# Patient Record
Sex: FEMALE | Race: WHITE | NOT HISPANIC OR LATINO | Employment: FULL TIME | URBAN - METROPOLITAN AREA
[De-identification: names, ages, dates, MRNs, and addresses within clinical notes are randomized per-mention and may not be internally consistent; named-entity substitution may affect disease eponyms.]

---

## 2017-02-16 ENCOUNTER — GENERIC CONVERSION - ENCOUNTER (OUTPATIENT)
Dept: OTHER | Facility: OTHER | Age: 27
End: 2017-02-16

## 2017-05-02 ENCOUNTER — GENERIC CONVERSION - ENCOUNTER (OUTPATIENT)
Dept: OTHER | Facility: OTHER | Age: 27
End: 2017-05-02

## 2017-06-19 ENCOUNTER — GENERIC CONVERSION - ENCOUNTER (OUTPATIENT)
Dept: OTHER | Facility: OTHER | Age: 27
End: 2017-06-19

## 2017-10-19 ENCOUNTER — GENERIC CONVERSION - ENCOUNTER (OUTPATIENT)
Dept: OTHER | Facility: OTHER | Age: 27
End: 2017-10-19

## 2017-10-19 ENCOUNTER — GENERIC CONVERSION - ENCOUNTER (OUTPATIENT)
Dept: FAMILY MEDICINE CLINIC | Facility: CLINIC | Age: 27
End: 2017-10-19

## 2017-11-13 ENCOUNTER — GENERIC CONVERSION - ENCOUNTER (OUTPATIENT)
Dept: FAMILY MEDICINE CLINIC | Facility: CLINIC | Age: 27
End: 2017-11-13

## 2018-03-06 ENCOUNTER — OFFICE VISIT (OUTPATIENT)
Dept: FAMILY MEDICINE CLINIC | Facility: CLINIC | Age: 28
End: 2018-03-06
Payer: COMMERCIAL

## 2018-03-06 VITALS
HEART RATE: 96 BPM | HEIGHT: 61 IN | DIASTOLIC BLOOD PRESSURE: 78 MMHG | SYSTOLIC BLOOD PRESSURE: 112 MMHG | BODY MASS INDEX: 19.83 KG/M2 | TEMPERATURE: 100.4 F | WEIGHT: 105 LBS | OXYGEN SATURATION: 98 %

## 2018-03-06 DIAGNOSIS — J11.1 INFLUENZA: Primary | ICD-10-CM

## 2018-03-06 PROCEDURE — 3008F BODY MASS INDEX DOCD: CPT | Performed by: NURSE PRACTITIONER

## 2018-03-06 PROCEDURE — 99213 OFFICE O/P EST LOW 20 MIN: CPT | Performed by: NURSE PRACTITIONER

## 2018-03-06 RX ORDER — LEVOTHYROXINE SODIUM 25 UG/1
25 TABLET ORAL DAILY
COMMUNITY
Start: 2018-02-27 | End: 2018-04-23 | Stop reason: SDUPTHER

## 2018-03-06 RX ORDER — DEXTROAMPHETAMINE SACCHARATE, AMPHETAMINE ASPARTATE MONOHYDRATE, DEXTROAMPHETAMINE SULFATE AND AMPHETAMINE SULFATE 6.25; 6.25; 6.25; 6.25 MG/1; MG/1; MG/1; MG/1
1 CAPSULE, EXTENDED RELEASE ORAL DAILY
COMMUNITY

## 2018-03-06 RX ORDER — OSELTAMIVIR PHOSPHATE 75 MG/1
75 CAPSULE ORAL EVERY 12 HOURS SCHEDULED
Qty: 10 CAPSULE | Refills: 0 | Status: SHIPPED | OUTPATIENT
Start: 2018-03-06 | End: 2018-03-11

## 2018-03-06 NOTE — PROGRESS NOTES
Assessment/Plan   Diagnoses and all orders for this visit:    Influenza  -     oseltamivir (TAMIFLU) 75 mg capsule; Take 1 capsule (75 mg total) by mouth every 12 (twelve) hours for 5 days    Other orders  -     amphetamine-dextroamphetamine (ADDERALL XR, 25MG,) 25 MG 24 hr capsule; Take 1 capsule by mouth daily  -     UNITHROID 25 MCG tablet; Chief Complaint   Patient presents with    Headache     for 1 day    Sore Throat     for 1 day    Generalized Body Aches     for 1 day    Fever     for 1 day       Subjective   Patient ID: Cesar Head is a 28 y o  female  Vitals:    03/06/18 0944   BP: 112/78   Pulse: 96   Temp: 100 4 °F (38 °C)   SpO2: 98%     Sore Throat    This is a new problem  The current episode started yesterday  The problem has been unchanged  Neither side of throat is experiencing more pain than the other  The maximum temperature recorded prior to her arrival was 101 - 101 9 F  The fever has been present for 1 to 2 days  The pain is at a severity of 9/10  The pain is severe  Associated symptoms include congestion, coughing, headaches and swollen glands  Pertinent negatives include no abdominal pain, diarrhea, drooling, ear discharge, hoarse voice, plugged ear sensation, neck pain, shortness of breath, stridor, trouble swallowing or vomiting  She has had no exposure to strep or mono  She has tried nothing for the symptoms  Generalized Body Aches   The current episode started yesterday  The problem occurs constantly  The problem has been gradually worsening since onset  The pain is moderate  Nothing aggravates the symptoms  Associated symptoms include congestion, headaches, rhinorrhea, a sore throat, swollen glands, fatigue, a fever and coughing  Pertinent negatives include no double vision, ear discharge, eye itching, photophobia, stridor, weight gain, weight loss, chest pain, shortness of breath, abdominal pain, diarrhea, nausea, vomiting or neck pain   Past treatments include nothing  The treatment provided no relief  The fever has been present for 1 to 2 days  Her temperature was unmeasured prior to arrival  The cough is non-productive  Nothing worsens the cough  The congestion does not interfere with sleep  The congestion does not interfere with eating or drinking  The rhinorrhea has been occurring frequently  The nasal discharge has a clear appearance  She has been experiencing a moderate sore throat  Neither side is more painful than the other  There were sick contacts at work  The following portions of the patient's history were reviewed and updated as appropriate: allergies, current medications, past family history, past medical history, past surgical history and problem list     Review of Systems   Constitutional: Positive for fatigue and fever  Negative for weight gain and weight loss  HENT: Positive for congestion, rhinorrhea and sore throat  Negative for drooling, ear discharge, hoarse voice and trouble swallowing  Eyes: Negative  Negative for double vision, photophobia and itching  Respiratory: Positive for cough  Negative for shortness of breath and stridor  Cardiovascular: Negative  Negative for chest pain  Gastrointestinal: Negative  Negative for abdominal pain, diarrhea, nausea and vomiting  Endocrine: Negative  Genitourinary: Negative  Musculoskeletal: Negative  Negative for neck pain  Skin: Negative  Allergic/Immunologic: Negative  Neurological: Positive for headaches  Hematological: Negative  Psychiatric/Behavioral: Negative  Objective     Physical Exam   Constitutional: She is oriented to person, place, and time  She appears well-nourished  No distress (appears ill)  HENT:   Head: Normocephalic  Right Ear: External ear normal  No mastoid tenderness  Tympanic membrane is not injected, not erythematous and not bulging  A middle ear effusion is present  Left Ear: External ear normal  No mastoid tenderness   Tympanic membrane is not injected, not erythematous and not bulging  A middle ear effusion is present  Nose: Rhinorrhea present  No mucosal edema or sinus tenderness  Right sinus exhibits no maxillary sinus tenderness and no frontal sinus tenderness  Left sinus exhibits no maxillary sinus tenderness and no frontal sinus tenderness  Mouth/Throat: Uvula is midline and oropharynx is clear and moist  Mucous membranes are not pale  No oropharyngeal exudate, posterior oropharyngeal edema, posterior oropharyngeal erythema or tonsillar abscesses  Tonsils are 0 on the right  Tonsils are 0 on the left  No tonsillar exudate  Eyes: Conjunctivae are normal  Right eye exhibits no discharge  Left eye exhibits no discharge  Neck: Normal range of motion  No thyromegaly present  Cardiovascular: Normal rate, regular rhythm and normal heart sounds  Exam reveals no gallop and no friction rub  No murmur heard  Pulmonary/Chest: Effort normal and breath sounds normal  No respiratory distress  She has no wheezes  She has no rales  Abdominal: Soft  Bowel sounds are normal  She exhibits no distension  There is no tenderness  There is no guarding  Musculoskeletal: Normal range of motion  She exhibits no tenderness  Lymphadenopathy:     She has cervical adenopathy (anterior and posterior cervical nodes with tenderness)  Neurological: She is alert and oriented to person, place, and time  Skin: Skin is warm and dry  No rash noted  No erythema  No pallor  Psychiatric: She has a normal mood and affect   Her behavior is normal  Judgment and thought content normal

## 2018-03-06 NOTE — PATIENT INSTRUCTIONS

## 2018-03-06 NOTE — LETTER
March 6, 2018     Patient: Minal Marcelino   YOB: 1990   Date of Visit: 3/6/2018       To Whom it May Concern:    Minal Marcelino is under my professional care  She was seen in my office on 3/6/2018  She may return to work on 3/12/2018  If you have any questions or concerns, please don't hesitate to call           Sincerely,          BRAD Cotton        CC: No Recipients

## 2018-04-20 LAB
FT4I SERPL CALC-MCNC: 2.2 (ref 1.4–3.8)
T3RU NFR SERPL: 31 % (ref 22–35)
T4 SERPL-MCNC: 7.2 MCG/DL (ref 4.5–12)
TSH SERPL-ACNC: 2.89 MIU/L

## 2018-04-23 ENCOUNTER — OFFICE VISIT (OUTPATIENT)
Dept: ENDOCRINOLOGY | Facility: HOSPITAL | Age: 28
End: 2018-04-23
Payer: COMMERCIAL

## 2018-04-23 VITALS
BODY MASS INDEX: 20.13 KG/M2 | DIASTOLIC BLOOD PRESSURE: 64 MMHG | HEIGHT: 62 IN | HEART RATE: 86 BPM | SYSTOLIC BLOOD PRESSURE: 116 MMHG | WEIGHT: 109.4 LBS

## 2018-04-23 DIAGNOSIS — Z86.39 HISTORY OF GRAVES' DISEASE: Primary | ICD-10-CM

## 2018-04-23 DIAGNOSIS — E89.0 POSTOPERATIVE HYPOTHYROIDISM: ICD-10-CM

## 2018-04-23 PROCEDURE — 99204 OFFICE O/P NEW MOD 45 MIN: CPT | Performed by: INTERNAL MEDICINE

## 2018-04-23 RX ORDER — LEVOTHYROXINE SODIUM 25 UG/1
25 TABLET ORAL DAILY
Qty: 32 TABLET | Refills: 11
Start: 2018-04-23 | End: 2018-06-26 | Stop reason: SDUPTHER

## 2018-04-23 NOTE — PROGRESS NOTES
4/23/2018    Assessment/Plan      Diagnoses and all orders for this visit:    History of Graves' disease  -     UNITHROID 25 MCG tablet; Take 1 tablet (25 mcg total) by mouth daily 1 tab daily 6 days of the week  1 5 tabs the 7th day  -     TSH, 3rd generation Lab Collect; Future  -     T3, free; Future  -     T4, free Lab Collect; Future  -     TSH, 3rd generation Lab Collect; Future  -     T4, free; Future    Postoperative hypothyroidism  -     UNITHROID 25 MCG tablet; Take 1 tablet (25 mcg total) by mouth daily 1 tab daily 6 days of the week  1 5 tabs the 7th day  -     TSH, 3rd generation Lab Collect; Future  -     T3, free; Future  -     T4, free Lab Collect; Future  -     TSH, 3rd generation Lab Collect; Future  -     T4, free; Future        Assessment/Plan:  1  Hypothyroidism postoperative: Will increase Unithroid 25 mcg tablets to 1 tablet 6 days of the week 1 5 tablets the 7th day  Will recheck TSH and free T4 in about 2 months  Follow-up in 6 months with blood work just prior  Discussed that if she were to become pregnant, she should let us know so we can increase her thyroid hormone replacement dose appropriately  2  History of hyperthyroidism status post surgery: The fact that she is requiring such as small dose of Unithroid makes me wonder if is possible that she may develop current hyperthyroidism so we will need to follow her levels  She will schedule with an eye doctor given changes in vision recently  And her history of Graves disease  CC:    hypothyroidism    History of Present Illness     HPI: Dolly Perry is a 28y o  year old female with history of  Hyperthyroidism due to Graves disease who underwent thyroidectomy and subsequent hypothyroidism presents to [facility] care  Previous patient doctor Richi  In 2015 she developed palpitations, tachycardia, weight loss  She then underwent thyroidectomy in September of 2015 and pathology showed benign thyroid tissue    This was done at [facility]   She then went on thyroid hormone replacement and her dose was decreased  She is required to surprisingly low dose of Unithroid and currently takes 25 mcg daily  Overall, she feels okay  She does note some fatigue  Denies any persistent symptoms of hyperthyroidism  Denies any neck compression  Denies family history of thyroid disease to her knowledge  She denies any eye discomfort, but does note some vision changes  Review of Systems   Constitutional: Positive for fatigue  Negative for chills and fever  HENT: Negative for trouble swallowing and voice change  Eyes: Negative for visual disturbance  Respiratory: Negative for shortness of breath  Cardiovascular: Negative for chest pain, palpitations and leg swelling  Gastrointestinal: Negative for abdominal pain, nausea and vomiting  Endocrine: Negative for cold intolerance, heat intolerance, polydipsia and polyuria  Musculoskeletal: Negative for arthralgias and myalgias  Skin: Negative for rash  Neurological: Negative for dizziness, tremors and weakness  Hematological: Negative for adenopathy  Psychiatric/Behavioral: Negative for agitation and confusion  Historical Information   No past medical history on file  No past surgical history on file    Social History   History   Alcohol Use    Yes     Comment: social     History   Drug Use No     History   Smoking Status    Former Smoker    Packs/day: 1 00    Years: 10 00    Types: Cigarettes    Quit date: 1/6/2018   Smokeless Tobacco    Never Used     Family History:   Family History   Problem Relation Age of Onset    Diabetes type II Mother     Hyperlipidemia Father     Hypertension Father        Meds/Allergies   Current Outpatient Prescriptions   Medication Sig Dispense Refill    amphetamine-dextroamphetamine (ADDERALL XR, 25MG,) 25 MG 24 hr capsule Take 1 capsule by mouth daily      UNITHROID 25 MCG tablet Take 1 tablet (25 mcg total) by mouth daily 1 tab daily 6 days of the week  1 5 tabs the 7th day  32 tablet 11     No current facility-administered medications for this visit  Allergies   Allergen Reactions    Penicillin V Hives       Objective   Vitals: Blood pressure 116/64, pulse 86, height 5' 1 75" (1 568 m), weight 49 6 kg (109 lb 6 4 oz)  Invasive Devices          No matching active lines, drains, or airways          Physical Exam   Constitutional: She is oriented to person, place, and time  She appears well-developed and well-nourished  No distress  HENT:   Head: Normocephalic and atraumatic  Mouth/Throat: No oropharyngeal exudate  Mild exopthalmos  Eyes: Conjunctivae and EOM are normal  Pupils are equal, round, and reactive to light  No scleral icterus  Neck: Normal range of motion  Neck supple  No thyromegaly present  Cardiovascular: Normal rate and regular rhythm  No murmur heard  Pulmonary/Chest: Effort normal and breath sounds normal  She has no wheezes  Abdominal: Soft  Bowel sounds are normal  She exhibits no distension  There is no tenderness  Musculoskeletal: Normal range of motion  She exhibits no edema  Neurological: She is alert and oriented to person, place, and time  She exhibits normal muscle tone  Skin: Skin is warm and dry  No rash noted  She is not diaphoretic  Psychiatric: She has a normal mood and affect  Her behavior is normal        The history was obtained from the review of the chart and from the patient      Lab Results:       Lab Results   Component Value Date    CREATININE 0 26 (L) 05/13/2015    BUN 8 05/13/2015     05/13/2015    K 4 7 05/13/2015     05/13/2015    CO2 22 (L) 05/13/2015       Lab Results   Component Value Date    CHOL 192 05/13/2015    HDL 46 05/13/2015    TRIG 133 05/13/2015       Lab Results   Component Value Date    ALT 59 05/13/2015    AST 25 05/13/2015    ALKPHOS 74 05/13/2015    BILITOT 0 58 05/13/2015       Lab Results   Component Value Date TSH 2 89 04/19/2018    FREET4 5 6 (HH) 05/13/2015       Recent Results (from the past 41645 hour(s))   Thyroid Panel With TSH    Collection Time: 04/19/18 10:55 AM   Result Value Ref Range    T3 Uptake Ratio 31 22 - 35 %    T4, Total 7 2 4 5 - 12 0 mcg/dL    Free T4 Index (T7) 2 2 1 4 - 3 8    TSH 2 89 mIU/L       Labs from [facility] in May of 2015 showed an elevated microsomal antibody  ultrasound done at [facility] on May 14, 2015 showed no dominant nodules but did show markedly hyperemic thyroid gland containing numerous micro nodules compatible with thyroiditis  No future appointments

## 2018-04-23 NOTE — LETTER
April 23, 2018     Bony Jennings, [address]  [facility] [address]    Patient: Nicolas Browning   YOB: 1990   Date of Visit: 4/23/2018       Dear Dr Peña Late: Thank you for referring Nicolas Browning to me for evaluation  Below are my notes for this consultation  If you have questions, please do not hesitate to call me  I look forward to following your patient along with you  Sincerely,        Mini Rivera DO        CC: No Recipients  Mini Rivera DO  4/23/2018  1:13 PM  Sign at close encounter  4/23/2018    Assessment/Plan      Diagnoses and all orders for this visit:    History of Graves' disease  -     UNITHROID 25 MCG tablet; Take 1 tablet (25 mcg total) by mouth daily 1 tab daily 6 days of the week  1 5 tabs the 7th day  -     TSH, 3rd generation Lab Collect; Future  -     T3, free; Future  -     T4, free Lab Collect; Future  -     TSH, 3rd generation Lab Collect; Future  -     T4, free; Future    Postoperative hypothyroidism  -     UNITHROID 25 MCG tablet; Take 1 tablet (25 mcg total) by mouth daily 1 tab daily 6 days of the week  1 5 tabs the 7th day  -     TSH, 3rd generation Lab Collect; Future  -     T3, free; Future  -     T4, free Lab Collect; Future  -     TSH, 3rd generation Lab Collect; Future  -     T4, free; Future        Assessment/Plan:  1  Hypothyroidism postoperative: Will increase Unithroid 25 mcg tablets to 1 tablet 6 days of the week 1 5 tablets the 7th day  Will recheck TSH and free T4 in about 2 months  Follow-up in 6 months with blood work just prior  Discussed that if she were to become pregnant, she should let us know so we can increase her thyroid hormone replacement dose appropriately  2  History of hyperthyroidism status post surgery: The fact that she is requiring such as small dose of Unithroid makes me wonder if is possible that she may develop current hyperthyroidism so we will need to follow her levels    She will schedule with an eye doctor given changes in vision recently  And her history of Graves disease  CC:    hypothyroidism    History of Present Illness     HPI: Mak Hall is a 28y o  year old female with history of  Hyperthyroidism due to Graves disease who underwent thyroidectomy and subsequent hypothyroidism presents to establish care  Previous patient doctor Richi  In 2015 she developed palpitations, tachycardia, weight loss  She then underwent thyroidectomy in September of 2015 and pathology showed benign thyroid tissue  This was done at [facility]   She then went on thyroid hormone replacement and her dose was decreased  She is required to surprisingly low dose of Unithroid and currently takes 25 mcg daily  Overall, she feels okay  She does note some fatigue  Denies any persistent symptoms of hyperthyroidism  Denies any neck compression  Denies family history of thyroid disease to her knowledge  She denies any eye discomfort, but does note some vision changes  Review of Systems   Constitutional: Positive for fatigue  Negative for chills and fever  HENT: Negative for trouble swallowing and voice change  Eyes: Negative for visual disturbance  Respiratory: Negative for shortness of breath  Cardiovascular: Negative for chest pain, palpitations and leg swelling  Gastrointestinal: Negative for abdominal pain, nausea and vomiting  Endocrine: Negative for cold intolerance, heat intolerance, polydipsia and polyuria  Musculoskeletal: Negative for arthralgias and myalgias  Skin: Negative for rash  Neurological: Negative for dizziness, tremors and weakness  Hematological: Negative for adenopathy  Psychiatric/Behavioral: Negative for agitation and confusion  Historical Information   No past medical history on file  No past surgical history on file    Social History   History   Alcohol Use    Yes     Comment: social     History   Drug Use No     History   Smoking Status    Former Smoker    Packs/day: 1 00    Years: 10 00    Types: Cigarettes    Quit date: 1/6/2018   Smokeless Tobacco    Never Used     Family History:   Family History   Problem Relation Age of Onset    Diabetes type II Mother     Hyperlipidemia Father     Hypertension Father        Meds/Allergies   Current Outpatient Prescriptions   Medication Sig Dispense Refill    amphetamine-dextroamphetamine (ADDERALL XR, 25MG,) 25 MG 24 hr capsule Take 1 capsule by mouth daily      UNITHROID 25 MCG tablet Take 1 tablet (25 mcg total) by mouth daily 1 tab daily 6 days of the week  1 5 tabs the 7th day  32 tablet 11     No current facility-administered medications for this visit  Allergies   Allergen Reactions    Penicillin V Hives       Objective   Vitals: Blood pressure 116/64, pulse 86, height 5' 1 75" (1 568 m), weight 49 6 kg (109 lb 6 4 oz)  Invasive Devices          No matching active lines, drains, or airways          Physical Exam   Constitutional: She is oriented to person, place, and time  She appears well-developed and well-nourished  No distress  HENT:   Head: Normocephalic and atraumatic  Mouth/Throat: No oropharyngeal exudate  Mild exopthalmos  Eyes: Conjunctivae and EOM are normal  Pupils are equal, round, and reactive to light  No scleral icterus  Neck: Normal range of motion  Neck supple  No thyromegaly present  Cardiovascular: Normal rate and regular rhythm  No murmur heard  Pulmonary/Chest: Effort normal and breath sounds normal  She has no wheezes  Abdominal: Soft  Bowel sounds are normal  She exhibits no distension  There is no tenderness  Musculoskeletal: Normal range of motion  She exhibits no edema  Neurological: She is alert and oriented to person, place, and time  She exhibits normal muscle tone  Skin: Skin is warm and dry  No rash noted  She is not diaphoretic  Psychiatric: She has a normal mood and affect   Her behavior is normal        The history was obtained from the review of the chart and from the patient  Lab Results:       Lab Results   Component Value Date    CREATININE 0 26 (L) 05/13/2015    BUN 8 05/13/2015     05/13/2015    K 4 7 05/13/2015     05/13/2015    CO2 22 (L) 05/13/2015       Lab Results   Component Value Date    CHOL 192 05/13/2015    HDL 46 05/13/2015    TRIG 133 05/13/2015       Lab Results   Component Value Date    ALT 59 05/13/2015    AST 25 05/13/2015    ALKPHOS 74 05/13/2015    BILITOT 0 58 05/13/2015       Lab Results   Component Value Date    TSH 2 89 04/19/2018    FREET4 5 6 (HH) 05/13/2015       Recent Results (from the past 56447 hour(s))   Thyroid Panel With TSH    Collection Time: 04/19/18 10:55 AM   Result Value Ref Range    T3 Uptake Ratio 31 22 - 35 %    T4, Total 7 2 4 5 - 12 0 mcg/dL    Free T4 Index (T7) 2 2 1 4 - 3 8    TSH 2 89 mIU/L       Labs from [facility] in May of 2015 showed an elevated microsomal antibody  ultrasound done at [facility] on May 14, 2015 showed no dominant nodules but did show markedly hyperemic thyroid gland containing numerous micro nodules compatible with thyroiditis  No future appointments

## 2018-06-26 DIAGNOSIS — E89.0 POSTOPERATIVE HYPOTHYROIDISM: ICD-10-CM

## 2018-06-26 DIAGNOSIS — Z86.39 HISTORY OF GRAVES' DISEASE: ICD-10-CM

## 2018-06-26 RX ORDER — LEVOTHYROXINE SODIUM 25 UG/1
25 TABLET ORAL DAILY
Qty: 32 TABLET | Refills: 0
Start: 2018-06-26 | End: 2018-06-27 | Stop reason: SDUPTHER

## 2018-06-27 DIAGNOSIS — E89.0 POSTOPERATIVE HYPOTHYROIDISM: ICD-10-CM

## 2018-06-27 DIAGNOSIS — Z86.39 HISTORY OF GRAVES' DISEASE: ICD-10-CM

## 2018-06-27 RX ORDER — LEVOTHYROXINE SODIUM 25 UG/1
25 TABLET ORAL DAILY
Qty: 32 TABLET | Refills: 2 | Status: SHIPPED | OUTPATIENT
Start: 2018-06-27 | End: 2018-09-18 | Stop reason: SDUPTHER

## 2018-06-29 LAB
T3FREE SERPL-MCNC: 3 PG/ML (ref 2.3–4.2)
T4 FREE SERPL-MCNC: 1.1 NG/DL (ref 0.8–1.8)
TSH SERPL-ACNC: 1.89 MIU/L

## 2018-09-18 DIAGNOSIS — Z86.39 HISTORY OF GRAVES' DISEASE: ICD-10-CM

## 2018-09-18 DIAGNOSIS — E89.0 POSTOPERATIVE HYPOTHYROIDISM: ICD-10-CM

## 2018-09-18 RX ORDER — LEVOTHYROXINE SODIUM 25 UG/1
TABLET ORAL
Qty: 32 TABLET | Refills: 2 | Status: SHIPPED | OUTPATIENT
Start: 2018-09-18 | End: 2018-11-26

## 2018-10-19 ENCOUNTER — TELEPHONE (OUTPATIENT)
Dept: ENDOCRINOLOGY | Facility: HOSPITAL | Age: 28
End: 2018-10-19

## 2018-10-19 DIAGNOSIS — E04.9 THYROID ENLARGEMENT: Primary | ICD-10-CM

## 2018-10-19 DIAGNOSIS — E05.90 HYPERTHYROIDISM: ICD-10-CM

## 2018-10-19 NOTE — TELEPHONE ENCOUNTER
Pt's mom calling on behalf of patient requesting labs be faxed to [facility] for 10/23/18 appt  Could you re-date or reorder TSH and T4, free (dated 10/23/18) so that she can get done now   Thanks

## 2018-10-21 LAB
T4 FREE SERPL-MCNC: 1 NG/DL (ref 0.8–1.8)
TSH SERPL-ACNC: 3.67 MIU/L

## 2018-10-23 ENCOUNTER — OFFICE VISIT (OUTPATIENT)
Dept: ENDOCRINOLOGY | Facility: HOSPITAL | Age: 28
End: 2018-10-23
Payer: COMMERCIAL

## 2018-10-23 VITALS
DIASTOLIC BLOOD PRESSURE: 70 MMHG | HEART RATE: 80 BPM | SYSTOLIC BLOOD PRESSURE: 104 MMHG | BODY MASS INDEX: 21.42 KG/M2 | HEIGHT: 62 IN | WEIGHT: 116.4 LBS

## 2018-10-23 DIAGNOSIS — E89.0 POSTOPERATIVE HYPOTHYROIDISM: Primary | ICD-10-CM

## 2018-10-23 DIAGNOSIS — Z86.39 HISTORY OF GRAVES' DISEASE: ICD-10-CM

## 2018-10-23 PROCEDURE — 99213 OFFICE O/P EST LOW 20 MIN: CPT | Performed by: NURSE PRACTITIONER

## 2018-10-23 NOTE — PROGRESS NOTES
Radene Sandifer 33 y o  female MRN: [id number]    Encounter: [id number]      Assessment/Plan     Assessment: This is a 33y o -year-old female with postsurgical hypothyroidism and history of Graves disease  Plan:  1  Hypothyroidism postoperative:  Her most recent TSH is in the upper end of normal  I have asked her to increase her dose of unithroid to 50 mcg daily  Recheck TSH and free T4 in 6 weeks to reassess  Further titration of her unithroid dose will take place after review of updated lab work, if necessary      2  History of hyperthyroidism status post surgery:  She complains of some blurry vision and diplopia at times  Given her history of Graves eye disease, I have urged her to follow up with her ophthalmologist for an examination  CC:   Hypothyroidism follow-up  History of Present Illness     HPI:  33y o  year old female with history of  Hyperthyroidism due to Graves disease who underwent thyroidectomy and subsequent hypothyroidism presents for follow-up  In 2015 she developed palpitations, tachycardia, weight loss  She then underwent thyroidectomy in September of 2015 at [facility] and pathology showed benign thyroid tissue  She then went on thyroid hormone replacement and her dose was decreased over time  She currently takes Unithroid 25 mcg daily with an extra half tablet on Sunday  Her most recent TSH from October 22, 2017 is 3 67 with a free T4 of 1 0  Overall, she feels okay  She does note some fatigue but attributes this to her work schedule  Denies any persistent symptoms of hypo or hyperthyroidism  Denies any neck compression  Denies family history of thyroid disease to her knowledge  She denies any eye discomfort, but does note some vision changes citing some blurry vision and diplopia at times  Review of Systems   Constitutional: Positive for fatigue  Negative for chills and fever  HENT: Negative  Negative for trouble swallowing and voice change  Eyes: Positive for visual disturbance (Blurry vision and diplopia at times)  Negative for photophobia, pain, discharge, redness and itching  Respiratory: Negative  Negative for chest tightness and shortness of breath  Cardiovascular: Negative  Negative for chest pain  Gastrointestinal: Negative  Negative for abdominal pain, constipation, diarrhea and vomiting  Endocrine: Negative for cold intolerance, heat intolerance, polydipsia, polyphagia and polyuria  Genitourinary: Negative  Musculoskeletal: Negative  Skin: Negative  Allergic/Immunologic: Negative  Neurological: Negative  Negative for dizziness, syncope, light-headedness and headaches  Hematological: Negative  Psychiatric/Behavioral: Negative  All other systems reviewed and are negative  Historical Information   No past medical history on file  No past surgical history on file  Social History   History   Alcohol Use    Yes     Comment: social     History   Drug Use No     History   Smoking Status    Former Smoker    Packs/day: 1 00    Years: 10 00    Types: Cigarettes    Quit date: 1/6/2018   Smokeless Tobacco    Never Used     Family History:   Family History   Problem Relation Age of Onset    Diabetes type II Mother     Hyperlipidemia Father     Hypertension Father        Meds/Allergies   Current Outpatient Prescriptions   Medication Sig Dispense Refill    amphetamine-dextroamphetamine (ADDERALL XR, 25MG,) 25 MG 24 hr capsule Take 1 capsule by mouth daily      UNITHROID 25 MCG tablet TAKE 1 TABLET BY MOUTH DAILY 6 DAYS OF THE WEEK, THEN TAKE 1 AND 1/2 TABLETS ON THE 7TH DAY 32 tablet 2     No current facility-administered medications for this visit  Allergies   Allergen Reactions    Penicillin V Hives       Objective   Vitals: Blood pressure 104/70, pulse 80, height 5' 1 75" (1 568 m), weight 52 8 kg (116 lb 6 4 oz)  Physical Exam   Constitutional: She is oriented to person, place, and time   She appears well-developed and well-nourished  No distress  HENT:   Head: Normocephalic and atraumatic  Mouth/Throat: Oropharynx is clear and moist    Eyes: Pupils are equal, round, and reactive to light  Conjunctivae and EOM are normal  Right eye exhibits no discharge  Left eye exhibits no discharge  No scleral icterus  Neck: Normal range of motion  Neck supple  No JVD present  No tracheal deviation present  No thyromegaly present  Healed surgical scar to midline neck   Cardiovascular: Normal rate, regular rhythm, normal heart sounds and intact distal pulses  Exam reveals no gallop and no friction rub  No murmur heard  Pulmonary/Chest: Effort normal and breath sounds normal  No stridor  No respiratory distress  She has no wheezes  She has no rales  She exhibits no tenderness  Abdominal: Soft  Bowel sounds are normal  She exhibits no distension  There is no tenderness  Musculoskeletal: Normal range of motion  She exhibits no edema, tenderness or deformity  Lymphadenopathy:     She has no cervical adenopathy  Neurological: She is alert and oriented to person, place, and time  She displays normal reflexes  No cranial nerve deficit  Coordination normal    Skin: Skin is warm and dry  No rash noted  No erythema  No pallor  Dry skin   Psychiatric: She has a normal mood and affect  Her behavior is normal  Judgment and thought content normal    Vitals reviewed  Lab Results:   Lab Results   Component Value Date/Time    Free t4 1 0 10/20/2018 07:36 AM    Free t4 1 1 06/28/2018 10:53 AM     Portions of the record may have been created with voice recognition software  Occasional wrong word or "sound a like" substitutions may have occurred due to the inherent limitations of voice recognition software  Read the chart carefully and recognize, using context, where substitutions have occurred

## 2018-10-23 NOTE — PATIENT INSTRUCTIONS
Increase unithroid to 50 mcg daily  Recheck TSH and free T4 in 6 weeks to reassess  Your dosage may change after review of updated lab work  Follow up with opthalmology for eye examination, as discussed

## 2018-11-26 DIAGNOSIS — E03.9 HYPOTHYROIDISM, UNSPECIFIED TYPE: Primary | ICD-10-CM

## 2018-11-26 RX ORDER — LEVOTHYROXINE SODIUM 0.05 MG/1
50 TABLET ORAL DAILY
Qty: 30 TABLET | Refills: 3 | Status: SHIPPED | OUTPATIENT
Start: 2018-11-26 | End: 2019-03-22 | Stop reason: SDUPTHER

## 2018-11-26 NOTE — TELEPHONE ENCOUNTER
Johnnie Gonzales advised pt to up her Unithroid to 50 daily  Can you please send this to the pharmacy

## 2018-12-02 DIAGNOSIS — E89.0 POSTOPERATIVE HYPOTHYROIDISM: ICD-10-CM

## 2018-12-02 DIAGNOSIS — Z86.39 HISTORY OF GRAVES' DISEASE: ICD-10-CM

## 2018-12-02 RX ORDER — LEVOTHYROXINE SODIUM 25 UG/1
TABLET ORAL
Qty: 32 TABLET | Refills: 2 | Status: SHIPPED | OUTPATIENT
Start: 2018-12-02 | End: 2019-11-11

## 2018-12-15 LAB
T4 FREE SERPL-MCNC: 1.2 NG/DL (ref 0.8–1.8)
TSH SERPL-ACNC: 1.1 MIU/L

## 2019-03-22 DIAGNOSIS — E03.9 HYPOTHYROIDISM, UNSPECIFIED TYPE: ICD-10-CM

## 2019-03-22 RX ORDER — LEVOTHYROXINE SODIUM 50 UG/1
TABLET ORAL
Qty: 30 TABLET | Refills: 3 | Status: SHIPPED | OUTPATIENT
Start: 2019-03-22 | End: 2019-07-19 | Stop reason: SDUPTHER

## 2019-05-03 ENCOUNTER — TELEPHONE (OUTPATIENT)
Dept: ENDOCRINOLOGY | Facility: HOSPITAL | Age: 29
End: 2019-05-03

## 2019-05-03 DIAGNOSIS — E89.0 POSTOPERATIVE HYPOTHYROIDISM: Primary | ICD-10-CM

## 2019-05-03 DIAGNOSIS — Z86.39 HISTORY OF GRAVES' DISEASE: ICD-10-CM

## 2019-05-04 LAB
T4 FREE SERPL-MCNC: 1.1 NG/DL (ref 0.8–1.8)
TSH SERPL-ACNC: 1.6 MIU/L

## 2019-05-07 ENCOUNTER — OFFICE VISIT (OUTPATIENT)
Dept: ENDOCRINOLOGY | Facility: HOSPITAL | Age: 29
End: 2019-05-07
Payer: COMMERCIAL

## 2019-05-07 VITALS
HEIGHT: 62 IN | SYSTOLIC BLOOD PRESSURE: 100 MMHG | DIASTOLIC BLOOD PRESSURE: 70 MMHG | WEIGHT: 110.2 LBS | BODY MASS INDEX: 20.28 KG/M2 | HEART RATE: 85 BPM

## 2019-05-07 DIAGNOSIS — E89.0 POSTOPERATIVE HYPOTHYROIDISM: Primary | ICD-10-CM

## 2019-05-07 PROCEDURE — 99213 OFFICE O/P EST LOW 20 MIN: CPT | Performed by: NURSE PRACTITIONER

## 2019-07-19 DIAGNOSIS — E03.9 HYPOTHYROIDISM, UNSPECIFIED TYPE: ICD-10-CM

## 2019-07-19 RX ORDER — LEVOTHYROXINE SODIUM 50 UG/1
TABLET ORAL
Qty: 30 TABLET | Refills: 3 | Status: SHIPPED | OUTPATIENT
Start: 2019-07-19 | End: 2019-10-16 | Stop reason: SDUPTHER

## 2019-10-16 DIAGNOSIS — E03.9 HYPOTHYROIDISM, UNSPECIFIED TYPE: ICD-10-CM

## 2019-10-16 RX ORDER — LEVOTHYROXINE SODIUM 50 UG/1
50 TABLET ORAL DAILY
Qty: 30 TABLET | Refills: 5 | Status: SHIPPED | OUTPATIENT
Start: 2019-10-16 | End: 2020-03-14

## 2019-11-10 DIAGNOSIS — E03.9 HYPOTHYROIDISM, UNSPECIFIED TYPE: ICD-10-CM

## 2019-11-11 ENCOUNTER — OFFICE VISIT (OUTPATIENT)
Dept: ENDOCRINOLOGY | Facility: HOSPITAL | Age: 29
End: 2019-11-11
Payer: COMMERCIAL

## 2019-11-11 VITALS
WEIGHT: 115.6 LBS | HEIGHT: 62 IN | BODY MASS INDEX: 21.27 KG/M2 | DIASTOLIC BLOOD PRESSURE: 68 MMHG | HEART RATE: 60 BPM | SYSTOLIC BLOOD PRESSURE: 112 MMHG

## 2019-11-11 DIAGNOSIS — E89.0 POSTOPERATIVE HYPOTHYROIDISM: Primary | ICD-10-CM

## 2019-11-11 DIAGNOSIS — Z86.39 HISTORY OF GRAVES' DISEASE: ICD-10-CM

## 2019-11-11 PROCEDURE — 99214 OFFICE O/P EST MOD 30 MIN: CPT | Performed by: INTERNAL MEDICINE

## 2019-11-11 RX ORDER — LEVOTHYROXINE SODIUM 50 UG/1
TABLET ORAL
Qty: 30 TABLET | Refills: 0 | OUTPATIENT
Start: 2019-11-11

## 2019-11-11 NOTE — PROGRESS NOTES
11/11/2019    Assessment/Plan      Diagnoses and all orders for this visit:    Postoperative hypothyroidism  -     TSH, 3rd generation Lab Collect; Future  -     T4, free Lab Collect; Future    History of Graves' disease        Assessment/Plan:  1  Postoperative hypothyroidism:  Clinically and biochemically euthyroid on current regimen  We will see her back in the office in 1 year with a TSH free T4 just prior  I did discuss that at the 1st sign of pregnancy, she should call us and we will increase her dose right away and then monitor thyroid function the 4 weeks during pregnancy  2  History of Graves disease: In the past has seen Ophthalmology  No new vision changes or concerns  CC:  Follow-up    History of Present Illness     HPI: Jared Vaca is a 26y o  year old female with history of hyperthyroidism due to Graves disease status post thyroidectomy with resultant postoperative hypothyroidism who presents for follow-up  In 2015, she developed palpitations, tachycardia, weight loss  She was discovered to have hyperthyroidism due to Graves disease  In September of 2015 she underwent thyroidectomy with pathology showing benign thyroid tissue  She is maintained on Unithroid brand 50 mcg daily  She presents today overall feeling well without any new health issues or symptoms of concern  Review of Systems   Constitutional: Negative for fatigue  HENT: Negative for trouble swallowing and voice change  Eyes: Negative for visual disturbance  Respiratory: Negative for shortness of breath  Cardiovascular: Negative for palpitations and leg swelling  Gastrointestinal: Negative for abdominal pain, nausea and vomiting  Endocrine: Negative for polydipsia and polyuria  Musculoskeletal: Negative for arthralgias and myalgias  Skin: Negative for rash  Neurological: Negative for dizziness, tremors and weakness  Hematological: Negative for adenopathy     Psychiatric/Behavioral: Negative for agitation and confusion  Historical Information   History reviewed  No pertinent past medical history  History reviewed  No pertinent surgical history  Social History   Social History     Substance and Sexual Activity   Alcohol Use Yes    Comment: social     Social History     Substance and Sexual Activity   Drug Use No     Social History     Tobacco Use   Smoking Status Former Smoker    Packs/day:  00    Years: 10     Pack years: 10     Types: Cigarettes    Last attempt to quit: 2018    Years since quittin 8   Smokeless Tobacco Never Used     Family History:   Family History   Problem Relation Age of Onset    Diabetes type II Mother     Hyperlipidemia Father     Hypertension Father        Meds/Allergies   Current Outpatient Medications   Medication Sig Dispense Refill    amphetamine-dextroamphetamine (ADDERALL XR, 25MG,) 25 MG 24 hr capsule Take 1 capsule by mouth daily      UNITHROID 50 MCG tablet Take 1 tablet (50 mcg total) by mouth daily 30 tablet 5     No current facility-administered medications for this visit  Allergies   Allergen Reactions    Penicillin V Hives       Objective   Vitals: Blood pressure 112/68, pulse 60, height 5' 1 75" (1 568 m), weight 52 4 kg (115 lb 9 6 oz)  Invasive Devices     None                 Physical Exam   Constitutional: She is oriented to person, place, and time  She appears well-developed and well-nourished  No distress  HENT:   Head: Normocephalic and atraumatic  Neck: Normal range of motion  Neck supple  No thyromegaly present  Cardiovascular: Normal rate and regular rhythm  Pulmonary/Chest: Effort normal and breath sounds normal  No respiratory distress  Abdominal: Soft  Bowel sounds are normal    Musculoskeletal: Normal range of motion  She exhibits no edema  Neurological: She is alert and oriented to person, place, and time  She exhibits normal muscle tone  Skin: Skin is warm and dry  No rash noted   She is not diaphoretic  Psychiatric: She has a normal mood and affect  Her behavior is normal    Vitals reviewed  The history was obtained from the review of the chart and from the patient  Lab Results:      Recent Results (from the past 12221 hour(s))   TSH+Free T4    Collection Time: 10/20/18  7:36 AM   Result Value Ref Range    TSH 3 67 mIU/L    Free t4 1 0 0 8 - 1 8 ng/dL   TSH+Free T4    Collection Time: 12/14/18  1:21 PM   Result Value Ref Range    TSH 1 10 mIU/L    Free t4 1 2 0 8 - 1 8 ng/dL   TSH+Free T4    Collection Time: 05/03/19  1:07 PM   Result Value Ref Range    TSH 1 60 mIU/L    Free t4 1 1 0 8 - 1 8 ng/dL     Labs from [facility] on 11/07/19:  TSH 1 31, free T4 1 1  No future appointments  Portions of the record may have been created with voice recognition software  Occasional wrong word or "sound a like" substitutions may have occurred due to the inherent limitations of voice recognition software  Read the chart carefully and recognize, using context, where substitutions have occurred

## 2020-03-14 DIAGNOSIS — E03.9 HYPOTHYROIDISM, UNSPECIFIED TYPE: ICD-10-CM

## 2020-03-14 RX ORDER — LEVOTHYROXINE SODIUM 50 UG/1
TABLET ORAL
Qty: 30 TABLET | Refills: 0 | Status: SHIPPED | OUTPATIENT
Start: 2020-03-14 | End: 2020-04-09

## 2020-04-09 DIAGNOSIS — E03.9 HYPOTHYROIDISM, UNSPECIFIED TYPE: ICD-10-CM

## 2020-04-09 RX ORDER — LEVOTHYROXINE SODIUM 50 UG/1
TABLET ORAL
Qty: 30 TABLET | Refills: 0 | Status: SHIPPED | OUTPATIENT
Start: 2020-04-09 | End: 2020-05-06

## 2020-05-06 DIAGNOSIS — E03.9 HYPOTHYROIDISM, UNSPECIFIED TYPE: ICD-10-CM

## 2020-05-06 RX ORDER — LEVOTHYROXINE SODIUM 50 UG/1
TABLET ORAL
Qty: 30 TABLET | Refills: 0 | Status: SHIPPED | OUTPATIENT
Start: 2020-05-06 | End: 2020-06-08

## 2020-06-06 DIAGNOSIS — E03.9 HYPOTHYROIDISM, UNSPECIFIED TYPE: ICD-10-CM

## 2020-06-08 RX ORDER — LEVOTHYROXINE SODIUM 50 UG/1
TABLET ORAL
Qty: 30 TABLET | Refills: 0 | Status: SHIPPED | OUTPATIENT
Start: 2020-06-08 | End: 2020-07-06

## 2020-07-06 DIAGNOSIS — E03.9 HYPOTHYROIDISM, UNSPECIFIED TYPE: ICD-10-CM

## 2020-07-06 RX ORDER — LEVOTHYROXINE SODIUM 50 UG/1
TABLET ORAL
Qty: 30 TABLET | Refills: 0 | Status: SHIPPED | OUTPATIENT
Start: 2020-07-06 | End: 2020-08-03

## 2020-08-03 DIAGNOSIS — E03.9 HYPOTHYROIDISM, UNSPECIFIED TYPE: ICD-10-CM

## 2020-08-03 RX ORDER — LEVOTHYROXINE SODIUM 50 UG/1
TABLET ORAL
Qty: 30 TABLET | Refills: 0 | Status: SHIPPED | OUTPATIENT
Start: 2020-08-03 | End: 2020-09-02

## 2020-09-02 DIAGNOSIS — E03.9 HYPOTHYROIDISM, UNSPECIFIED TYPE: ICD-10-CM

## 2020-09-02 RX ORDER — LEVOTHYROXINE SODIUM 50 UG/1
TABLET ORAL
Qty: 30 TABLET | Refills: 0 | Status: SHIPPED | OUTPATIENT
Start: 2020-09-02 | End: 2020-10-06

## 2020-10-05 DIAGNOSIS — E03.9 HYPOTHYROIDISM, UNSPECIFIED TYPE: ICD-10-CM

## 2020-10-06 RX ORDER — LEVOTHYROXINE SODIUM 50 UG/1
TABLET ORAL
Qty: 30 TABLET | Refills: 0 | Status: SHIPPED | OUTPATIENT
Start: 2020-10-06 | End: 2020-11-04

## 2020-11-04 DIAGNOSIS — E03.9 HYPOTHYROIDISM, UNSPECIFIED TYPE: ICD-10-CM

## 2020-11-04 RX ORDER — LEVOTHYROXINE SODIUM 50 UG/1
TABLET ORAL
Qty: 30 TABLET | Refills: 0 | Status: SHIPPED | OUTPATIENT
Start: 2020-11-04 | End: 2020-11-20 | Stop reason: SDUPTHER

## 2020-11-12 ENCOUNTER — DOCUMENTATION (OUTPATIENT)
Dept: ENDOCRINOLOGY | Facility: HOSPITAL | Age: 30
End: 2020-11-12

## 2020-11-12 DIAGNOSIS — E89.0 POSTOPERATIVE HYPOTHYROIDISM: Primary | ICD-10-CM

## 2020-11-14 LAB
T4 FREE SERPL-MCNC: 1 NG/DL (ref 0.8–1.8)
TSH SERPL-ACNC: 1.05 MIU/L

## 2020-11-20 ENCOUNTER — OFFICE VISIT (OUTPATIENT)
Dept: ENDOCRINOLOGY | Facility: HOSPITAL | Age: 30
End: 2020-11-20
Payer: COMMERCIAL

## 2020-11-20 VITALS
HEART RATE: 60 BPM | BODY MASS INDEX: 21.75 KG/M2 | SYSTOLIC BLOOD PRESSURE: 112 MMHG | DIASTOLIC BLOOD PRESSURE: 66 MMHG | WEIGHT: 118.2 LBS | HEIGHT: 62 IN

## 2020-11-20 DIAGNOSIS — E89.0 POSTOPERATIVE HYPOTHYROIDISM: Primary | ICD-10-CM

## 2020-11-20 DIAGNOSIS — Z86.39 HISTORY OF GRAVES' DISEASE: ICD-10-CM

## 2020-11-20 DIAGNOSIS — E03.9 HYPOTHYROIDISM, UNSPECIFIED TYPE: ICD-10-CM

## 2020-11-20 PROCEDURE — 99213 OFFICE O/P EST LOW 20 MIN: CPT | Performed by: INTERNAL MEDICINE

## 2020-11-20 RX ORDER — LEVOTHYROXINE SODIUM 0.05 MG/1
50 TABLET ORAL DAILY
Qty: 90 TABLET | Refills: 3 | Status: SHIPPED | OUTPATIENT
Start: 2020-11-20 | End: 2021-01-04 | Stop reason: SDUPTHER

## 2021-01-04 ENCOUNTER — TELEPHONE (OUTPATIENT)
Dept: ENDOCRINOLOGY | Facility: HOSPITAL | Age: 31
End: 2021-01-04

## 2021-01-04 DIAGNOSIS — E03.9 HYPOTHYROIDISM AFFECTING PREGNANCY IN FIRST TRIMESTER: Primary | ICD-10-CM

## 2021-01-04 DIAGNOSIS — E03.9 HYPOTHYROIDISM, UNSPECIFIED TYPE: ICD-10-CM

## 2021-01-04 DIAGNOSIS — O99.281 HYPOTHYROIDISM AFFECTING PREGNANCY IN FIRST TRIMESTER: Primary | ICD-10-CM

## 2021-01-04 RX ORDER — LEVOTHYROXINE SODIUM 0.05 MG/1
TABLET ORAL
Qty: 90 TABLET | Refills: 3
Start: 2021-01-04 | End: 2021-02-03 | Stop reason: SDUPTHER

## 2021-01-04 NOTE — TELEPHONE ENCOUNTER
Pt called you back this morning  She said the fax number is [phone]  Not sure if you got this to go through?

## 2021-01-04 NOTE — TELEPHONE ENCOUNTER
Received voicemail from patient, she reports she is pregnant  If she needs lab work, orders can be faxed to [facility]  Please advise

## 2021-01-04 NOTE — TELEPHONE ENCOUNTER
Please tell her congratulations  I would suggest increasing hre dose to 1 tab 5 days of the week and 2 tabs the other 2 days of the week and repeat tsh and free t4 in about 4 weeks and every 4 weeks thereafter

## 2021-02-03 DIAGNOSIS — E03.9 HYPOTHYROIDISM, UNSPECIFIED TYPE: ICD-10-CM

## 2021-02-03 RX ORDER — LEVOTHYROXINE SODIUM 0.05 MG/1
TABLET ORAL
Qty: 114 TABLET | Refills: 1 | Status: SHIPPED | OUTPATIENT
Start: 2021-02-03 | End: 2021-04-02 | Stop reason: SDUPTHER

## 2021-02-05 LAB
T4 FREE SERPL-MCNC: 0.9 NG/DL (ref 0.8–1.8)
TSH SERPL-ACNC: 1.91 MIU/L

## 2021-03-05 LAB
EXTERNAL HIV SCREEN: NORMAL
HBA1C MFR BLD HPLC: 4.7 %
HCV AB SER-ACNC: NON REACTIVE

## 2021-04-02 DIAGNOSIS — E03.9 HYPOTHYROIDISM, UNSPECIFIED TYPE: ICD-10-CM

## 2021-04-02 LAB
T4 FREE SERPL-MCNC: 1 NG/DL (ref 0.8–1.8)
TSH SERPL-ACNC: 3.15 MIU/L

## 2021-04-02 RX ORDER — LEVOTHYROXINE SODIUM 0.05 MG/1
TABLET ORAL
Qty: 114 TABLET | Refills: 1
Start: 2021-04-02 | End: 2021-05-04 | Stop reason: SDUPTHER

## 2021-05-01 LAB
T4 FREE SERPL-MCNC: 1 NG/DL (ref 0.8–1.8)
TSH SERPL-ACNC: 3.91 MIU/L

## 2021-05-04 DIAGNOSIS — E03.9 HYPOTHYROIDISM, UNSPECIFIED TYPE: ICD-10-CM

## 2021-05-04 RX ORDER — LEVOTHYROXINE SODIUM 0.05 MG/1
TABLET ORAL
Qty: 114 TABLET | Refills: 1
Start: 2021-05-04 | End: 2021-05-04 | Stop reason: SDUPTHER

## 2021-05-04 RX ORDER — LEVOTHYROXINE SODIUM 0.05 MG/1
TABLET ORAL
Qty: 144 TABLET | Refills: 1 | Status: SHIPPED | OUTPATIENT
Start: 2021-05-04 | End: 2021-09-07 | Stop reason: SDUPTHER

## 2021-05-26 LAB
T4 FREE SERPL-MCNC: 1.1 NG/DL (ref 0.8–1.8)
TSH SERPL-ACNC: 1.11 MIU/L

## 2021-06-24 LAB
T4 FREE SERPL-MCNC: 1 NG/DL (ref 0.8–1.8)
TSH SERPL-ACNC: 0.91 MIU/L

## 2021-09-07 ENCOUNTER — TELEPHONE (OUTPATIENT)
Dept: ENDOCRINOLOGY | Facility: HOSPITAL | Age: 31
End: 2021-09-07

## 2021-09-07 NOTE — TELEPHONE ENCOUNTER
Suggest reducing her Unithroid back to 1 tablet daily and repeating a TSH and free T4 in about 4-6 weeks

## 2021-09-07 NOTE — TELEPHONE ENCOUNTER
Received call from patient  She reports giving birth on 9/4/21  She would like to know how to adjust her Unithroid?

## 2021-10-24 LAB
T4 FREE SERPL-MCNC: 1.2 NG/DL (ref 0.8–1.8)
TSH SERPL-ACNC: 0.91 MIU/L

## 2021-11-10 DIAGNOSIS — E03.9 HYPOTHYROIDISM, UNSPECIFIED TYPE: ICD-10-CM

## 2021-11-10 RX ORDER — LEVOTHYROXINE SODIUM 0.05 MG/1
TABLET ORAL
Qty: 36 TABLET | Refills: 6 | Status: SHIPPED | OUTPATIENT
Start: 2021-11-10 | End: 2022-07-25

## 2021-12-08 ENCOUNTER — TELEPHONE (OUTPATIENT)
Dept: ENDOCRINOLOGY | Facility: HOSPITAL | Age: 31
End: 2021-12-08

## 2021-12-08 DIAGNOSIS — E03.9 HYPOTHYROIDISM, UNSPECIFIED TYPE: Primary | ICD-10-CM

## 2021-12-22 LAB
T4 FREE SERPL-MCNC: 1.2 NG/DL (ref 0.8–1.8)
TSH SERPL-ACNC: 0.77 MIU/L

## 2021-12-23 ENCOUNTER — OFFICE VISIT (OUTPATIENT)
Dept: ENDOCRINOLOGY | Facility: HOSPITAL | Age: 31
End: 2021-12-23
Payer: COMMERCIAL

## 2021-12-23 VITALS
DIASTOLIC BLOOD PRESSURE: 78 MMHG | WEIGHT: 135.8 LBS | SYSTOLIC BLOOD PRESSURE: 126 MMHG | HEIGHT: 62 IN | HEART RATE: 104 BPM | BODY MASS INDEX: 24.99 KG/M2

## 2021-12-23 DIAGNOSIS — E89.0 POSTOPERATIVE HYPOTHYROIDISM: Primary | ICD-10-CM

## 2021-12-23 PROCEDURE — 99213 OFFICE O/P EST LOW 20 MIN: CPT | Performed by: PHYSICIAN ASSISTANT

## 2022-07-13 ENCOUNTER — TELEPHONE (OUTPATIENT)
Dept: ENDOCRINOLOGY | Facility: HOSPITAL | Age: 32
End: 2022-07-13

## 2022-07-13 DIAGNOSIS — E89.0 POSTOPERATIVE HYPOTHYROIDISM: Primary | ICD-10-CM

## 2022-07-13 DIAGNOSIS — O99.281 HYPOTHYROIDISM AFFECTING PREGNANCY IN FIRST TRIMESTER: ICD-10-CM

## 2022-07-13 DIAGNOSIS — E03.9 HYPOTHYROIDISM AFFECTING PREGNANCY IN FIRST TRIMESTER: ICD-10-CM

## 2022-07-13 NOTE — TELEPHONE ENCOUNTER
I would like her to increase her levothyroxine 50 mcg to 1 tablet 5 days a week and 2 tablets 2 days a week  Get thyroid lab work completed in 4 weeks, and every 4 weeks after  I would recommend that she schedule a follow-up appointment in about 2 months  If there is any concerns in the meantime she should contact our office  Also tell her congratulations

## 2022-07-13 NOTE — TELEPHONE ENCOUNTER
Received call from patient  She is pregnant, about 4 weeks  Currently taking levothyroxine 50mcg daily  Please advise

## 2022-08-11 LAB
T4 FREE SERPL-MCNC: 1.2 NG/DL (ref 0.8–1.8)
TSH SERPL-ACNC: 2.39 MIU/L

## 2022-08-16 DIAGNOSIS — E03.9 HYPOTHYROIDISM, UNSPECIFIED TYPE: ICD-10-CM

## 2022-08-16 RX ORDER — LEVOTHYROXINE SODIUM 0.05 MG/1
TABLET ORAL
Qty: 132 TABLET | Refills: 1 | Status: SHIPPED | OUTPATIENT
Start: 2022-08-16 | End: 2022-11-23 | Stop reason: SDUPTHER

## 2022-08-23 NOTE — TELEPHONE ENCOUNTER
Patient called the office concerning Sean's recommendation to schedule a fu appointment in 2 months  She does not want to come into the office for fu appointments during her pregnancy due to insurance costs and distance to drive  Patient mentioned that she will do blood work every 4 weeks to monitor thyroid levels  She noted that with her first pregnancy in 2021, Dr Rose Mary Hastings didn't recommend follow-up appointments and monitored her pregnancy with blood work results  Patient also prefers not to see Aris Parrish for any future appointments  She was last seen on 12/23/21  Her next appointment is scheduled on 1/31/23 with Susan Sheldon  Is there a specific reason why she needs to be seen in 2 months? Would it be okay to continue monitoring her pregnancy with blood work only?

## 2022-08-25 NOTE — TELEPHONE ENCOUNTER
I recommend seeing people at least every trimester in pregnancy which is every 3 months but prefer every 2 months  I feel this is safer during pregnancy  It is ultimately up to her if she is willing to come in for recommended appointments

## 2022-09-08 LAB
T4 FREE SERPL-MCNC: 1.2 NG/DL (ref 0.8–1.8)
TSH SERPL-ACNC: 2.38 MIU/L

## 2022-09-09 DIAGNOSIS — E03.9 HYPOTHYROIDISM, UNSPECIFIED TYPE: Primary | ICD-10-CM

## 2022-11-23 DIAGNOSIS — E03.9 HYPOTHYROIDISM, UNSPECIFIED TYPE: ICD-10-CM

## 2022-11-23 RX ORDER — LEVOTHYROXINE SODIUM 0.05 MG/1
50 TABLET ORAL DAILY
Qty: 30 TABLET | Refills: 2 | Status: SHIPPED | OUTPATIENT
Start: 2022-11-23

## 2022-12-02 ENCOUNTER — TELEPHONE (OUTPATIENT)
Dept: ENDOCRINOLOGY | Facility: HOSPITAL | Age: 32
End: 2022-12-02

## 2022-12-02 DIAGNOSIS — E03.9 HYPOTHYROIDISM, UNSPECIFIED TYPE: Primary | ICD-10-CM

## 2022-12-02 NOTE — TELEPHONE ENCOUNTER
She should get a TSH and free T4 done now and then monthly while she is pregnant  We do not adjust her medication and till we see this new blood work  She should continue the same levothyroxine 50 mcg daily for now

## 2022-12-02 NOTE — TELEPHONE ENCOUNTER
Received call from patient  She reports she is 4 weeks pregnant, would like to know when she should have lab work or if her medication needs to be adjusted now?

## 2022-12-06 DIAGNOSIS — E03.9 HYPOTHYROIDISM, UNSPECIFIED TYPE: ICD-10-CM

## 2022-12-06 LAB
T4 FREE SERPL-MCNC: 1.1 NG/DL (ref 0.8–1.8)
TSH SERPL-ACNC: 3.35 MIU/L

## 2022-12-06 RX ORDER — LEVOTHYROXINE SODIUM 0.05 MG/1
50 TABLET ORAL DAILY
Qty: 30 TABLET | Refills: 2 | Status: SHIPPED | OUTPATIENT
Start: 2022-12-06 | End: 2022-12-19 | Stop reason: SDUPTHER

## 2022-12-19 DIAGNOSIS — E03.9 HYPOTHYROIDISM, UNSPECIFIED TYPE: ICD-10-CM

## 2022-12-19 RX ORDER — LEVOTHYROXINE SODIUM 0.05 MG/1
TABLET ORAL
Qty: 40 TABLET | Refills: 0 | Status: SHIPPED | OUTPATIENT
Start: 2022-12-19

## 2022-12-31 LAB
T4 FREE SERPL-MCNC: 1.2 NG/DL (ref 0.8–1.8)
TSH SERPL-ACNC: 1.27 MIU/L

## 2023-01-03 ENCOUNTER — OFFICE VISIT (OUTPATIENT)
Dept: ENDOCRINOLOGY | Facility: HOSPITAL | Age: 33
End: 2023-01-03

## 2023-01-03 VITALS
HEIGHT: 62 IN | SYSTOLIC BLOOD PRESSURE: 100 MMHG | DIASTOLIC BLOOD PRESSURE: 70 MMHG | BODY MASS INDEX: 23.19 KG/M2 | WEIGHT: 126 LBS | HEART RATE: 72 BPM

## 2023-01-03 DIAGNOSIS — O99.281 HYPOTHYROIDISM AFFECTING PREGNANCY IN FIRST TRIMESTER: ICD-10-CM

## 2023-01-03 DIAGNOSIS — E03.9 HYPOTHYROIDISM AFFECTING PREGNANCY IN FIRST TRIMESTER: ICD-10-CM

## 2023-01-03 DIAGNOSIS — E89.0 POSTOPERATIVE HYPOTHYROIDISM: Primary | ICD-10-CM

## 2023-01-03 NOTE — PROGRESS NOTES
Radene Sandifer 34 y o  female MRN: [id number]    Encounter: [id number]      Assessment/Plan     Assessment: This is a 34y o -year-old female with postoperative hypothyroidism  Plan:  Hypothyroidism affecting the first trimester:  TSH and free T4 are normal   We use that she is currently miscarrying  We will continue to follow-up with OB/GYN  For now she will continue levothyroxine 50 mcg 4 days a week and 100 mcg 3 days a week  Recheck TSH and free T4 in another 4 weeks  She will keep us posted on her status  Changes to her levothyroxine regimen will be based on upcoming lab work  CC:  Hyperthyroidism follow-up    History of Present Illness     HPI:  31 year old female with history of hyperthyroidism due to Graves disease status post thyroidectomy with resultant postoperative hypothyroidism who presents for a follow-up   In 2015 she developed palpitations, tachycardia, weight loss was discovered to have hyperthyroidism due to Graves disease   In September of 2015 she underwent thyroidectomy with pathology showing benign thyroid tissue   She is maintained on levothyroxine 50 mcg - 4 days and 3 days 100 mcg  Since becoming pregnant 8 weeks ago  Recent TSH from December 30, 2022 is 1 27 Free T4 of 1 2  She started experiencing spotting and followed up with OB/GYN last week  She believes she may be miscarrying as she continues with abdominal cramping and bleeding        Review of Systems   Constitutional: Negative  Negative for chills, fatigue and fever  HENT: Negative  Negative for trouble swallowing and voice change  Eyes: Negative  Negative for photophobia, pain, discharge, redness, itching and visual disturbance  Respiratory: Negative  Negative for chest tightness and shortness of breath  Cardiovascular: Negative  Negative for chest pain  Gastrointestinal: Positive for abdominal pain (cramping)  Negative for constipation, diarrhea and vomiting     Endocrine: Negative for cold intolerance, heat intolerance, polydipsia, polyphagia and polyuria  Genitourinary: Positive for vaginal bleeding (spotting and bleeding that started last week-seeing OB/GYN)  Musculoskeletal: Negative  Skin: Negative  Allergic/Immunologic: Negative  Neurological: Negative  Negative for dizziness, syncope, light-headedness and headaches  Hematological: Negative  Psychiatric/Behavioral: Negative  All other systems reviewed and are negative  Historical Information   History reviewed  No pertinent past medical history  History reviewed  No pertinent surgical history  Social History   Social History     Substance and Sexual Activity   Alcohol Use Yes    Comment: social     Social History     Substance and Sexual Activity   Drug Use No     Social History     Tobacco Use   Smoking Status Former   • Packs/day:    • Years: 10 00   • Pack years: 10 00   • Types: Cigarettes   • Quit date: 2018   • Years since quittin 9   Smokeless Tobacco Never     Family History:   Family History   Problem Relation Age of Onset   • Diabetes type II Mother    • Hyperlipidemia Father    • Hypertension Father        Meds/Allergies   Current Outpatient Medications   Medication Sig Dispense Refill   • amphetamine-dextroamphetamine (ADDERALL XR) 25 MG 24 hr capsule Take 1 capsule by mouth daily     • levothyroxine 50 mcg tablet 4 days a week and 2 tabs 3 days a week 40 tablet 0     No current facility-administered medications for this visit  Allergies   Allergen Reactions   • Penicillin V Hives       Objective   Vitals: Blood pressure 100/70, pulse 72, height 5' 1 75" (1 568 m), weight 57 2 kg (126 lb)  Physical Exam  Vitals reviewed  Constitutional:       Appearance: She is well-developed  HENT:      Head: Normocephalic and atraumatic  Eyes:      Conjunctiva/sclera: Conjunctivae normal       Pupils: Pupils are equal, round, and reactive to light        Comments: Wears glasses   Cardiovascular: Rate and Rhythm: Normal rate and regular rhythm  Heart sounds: Normal heart sounds  Pulmonary:      Effort: Pulmonary effort is normal       Breath sounds: Normal breath sounds  Abdominal:      General: Bowel sounds are normal       Palpations: Abdomen is soft  Musculoskeletal:         General: Normal range of motion  Cervical back: Normal range of motion and neck supple  Skin:     General: Skin is warm and dry  Neurological:      Mental Status: She is alert and oriented to person, place, and time  Psychiatric:         Behavior: Behavior normal          Thought Content: Thought content normal          Judgment: Judgment normal        Lab Results:   Lab Results   Component Value Date/Time    Free t4 1 2 12/30/2022 02:58 PM    Free t4 1 1 12/05/2022 01:10 PM    Free t4 1 2 09/08/2022 08:32 AM     Portions of the record may have been created with voice recognition software  Occasional wrong word or "sound a like" substitutions may have occurred due to the inherent limitations of voice recognition software  Read the chart carefully and recognize, using context, where substitutions have occurred

## 2023-01-03 NOTE — PATIENT INSTRUCTIONS
Continue Unithroid 50 mcg daily- 4 days with 100 mcg 3 days a week  Obtain lab work in 4 weeks  Follow up with OB/GYN  Contact the office if there is any change in symptoms  Follow up in 3 months with lab work completed prior to visit

## 2023-01-15 DIAGNOSIS — E03.9 HYPOTHYROIDISM, UNSPECIFIED TYPE: ICD-10-CM

## 2023-01-16 RX ORDER — LEVOTHYROXINE SODIUM 0.05 MG/1
TABLET ORAL
Qty: 40 TABLET | Refills: 0 | Status: SHIPPED | OUTPATIENT
Start: 2023-01-16

## 2023-01-20 LAB
T4 FREE SERPL-MCNC: 1.3 NG/DL (ref 0.8–1.8)
TSH SERPL-ACNC: 1.14 MIU/L

## 2023-02-07 DIAGNOSIS — E03.9 HYPOTHYROIDISM, UNSPECIFIED TYPE: ICD-10-CM

## 2023-02-07 RX ORDER — LEVOTHYROXINE SODIUM 0.05 MG/1
TABLET ORAL
Qty: 40 TABLET | Refills: 3 | Status: SHIPPED | OUTPATIENT
Start: 2023-02-07

## 2023-02-21 LAB
T4 FREE SERPL-MCNC: 1.1 NG/DL (ref 0.8–1.8)
TSH SERPL-ACNC: 0.99 MIU/L

## 2023-03-06 ENCOUNTER — TELEPHONE (OUTPATIENT)
Dept: ENDOCRINOLOGY | Facility: HOSPITAL | Age: 33
End: 2023-03-06

## 2023-03-06 NOTE — TELEPHONE ENCOUNTER
Received call from patient  Please review recent lab work from 2/21/23  Patient would like results prior to her her 4/4/23 appointment

## 2023-03-30 LAB
T4 FREE SERPL-MCNC: 1.1 NG/DL (ref 0.8–1.8)
TSH SERPL-ACNC: 1.43 MIU/L

## 2023-04-04 ENCOUNTER — OFFICE VISIT (OUTPATIENT)
Dept: ENDOCRINOLOGY | Facility: HOSPITAL | Age: 33
End: 2023-04-04

## 2023-04-04 VITALS
DIASTOLIC BLOOD PRESSURE: 74 MMHG | HEIGHT: 62 IN | WEIGHT: 124.2 LBS | SYSTOLIC BLOOD PRESSURE: 110 MMHG | BODY MASS INDEX: 22.86 KG/M2 | HEART RATE: 87 BPM

## 2023-04-04 DIAGNOSIS — O99.281 HYPOTHYROIDISM AFFECTING PREGNANCY IN FIRST TRIMESTER: Primary | ICD-10-CM

## 2023-04-04 DIAGNOSIS — E89.0 POSTOPERATIVE HYPOTHYROIDISM: ICD-10-CM

## 2023-04-04 DIAGNOSIS — E03.9 HYPOTHYROIDISM AFFECTING PREGNANCY IN FIRST TRIMESTER: Primary | ICD-10-CM

## 2023-04-04 DIAGNOSIS — E03.9 HYPOTHYROIDISM, UNSPECIFIED TYPE: ICD-10-CM

## 2023-04-04 RX ORDER — PROGESTERONE 200 MG/1
1 CAPSULE ORAL DAILY
COMMUNITY
Start: 2023-03-28

## 2023-04-04 NOTE — PROGRESS NOTES
Yanick Mandel 34 y o  female MRN: [id number]    Encounter: [id number]      Assessment/Plan     Assessment: This is a 34y o -year-old female with postoperative hypothyroidism  Plan:  Hypothyroidism affecting the first trimester:  TSH and free T4 are normal  We will continue to follow-up with high risk OB/GYN  For now she will continue levothyroxine 50 mcg  - 4 days a week and 100 mcg - 3 days a week  Recheck TSH and free T4 in another 4 weeks  She will keep us posted on her status  Changes to her levothyroxine regimen will be based on ongoing lab work  CC:   Hyperthyroidism follow-up    History of Present Illness     HPI:  31 year old female with history of hyperthyroidism due to Graves disease status post thyroidectomy with resultant postoperative hypothyroidism who presents for a follow-up   In 2015, she developed palpitations, tachycardia, weight loss was discovered to have hyperthyroidism due to Graves disease   In September of 2015, she underwent thyroidectomy with pathology showing benign thyroid tissue   She is maintained on levothyroxine 50 mcg - 4 days and 3 days of 100 mcg  She is currently 5 weeks pregnant    Recent TSH from March 30, 2023 is 1 43 Free T4 of 1 1  She experienced a miscarriage at 8 weeks in January 2023  Review of Systems   Constitutional: Negative  Negative for chills, fatigue and fever  HENT: Negative  Negative for trouble swallowing and voice change  Eyes: Negative  Negative for photophobia, pain, discharge, redness, itching and visual disturbance  Respiratory: Negative  Negative for chest tightness and shortness of breath  Cardiovascular: Negative  Negative for chest pain  Gastrointestinal: Negative  Negative for abdominal pain, constipation, diarrhea and vomiting  Endocrine: Negative for cold intolerance, heat intolerance, polydipsia, polyphagia and polyuria  Genitourinary: Negative  Musculoskeletal: Negative  Skin: Negative  "  Allergic/Immunologic: Negative  Neurological: Negative  Negative for dizziness, syncope, light-headedness and headaches  Hematological: Negative  Psychiatric/Behavioral: Negative  All other systems reviewed and are negative  Historical Information   History reviewed  No pertinent past medical history  History reviewed  No pertinent surgical history  Social History   Social History     Substance and Sexual Activity   Alcohol Use Yes    Comment: social     Social History     Substance and Sexual Activity   Drug Use No     Social History     Tobacco Use   Smoking Status Former   • Packs/day: 1 00   • Years: 10    • Pack years: 10 00   • Types: Cigarettes   • Quit date: 2018   • Years since quittin 2   Smokeless Tobacco Never     Family History:   Family History   Problem Relation Age of Onset   • Diabetes type II Mother    • Hyperlipidemia Father    • Hypertension Father        Meds/Allergies   Current Outpatient Medications   Medication Sig Dispense Refill   • amphetamine-dextroamphetamine (ADDERALL XR) 25 MG 24 hr capsule Take 1 capsule by mouth daily     • levothyroxine 50 mcg tablet TAKE 1 TABLET DAILY X4 DAYS PER WEEK AND 2 TABLETS DAILY 3 DAYS PER WEEK 40 tablet 3   • Progesterone 200 MG CAPS Take 1 capsule by mouth daily       No current facility-administered medications for this visit  Allergies   Allergen Reactions   • Penicillin V Hives       Objective   Vitals: Blood pressure 110/74, pulse 87, height 5' 1 75\" (1 568 m), weight 56 3 kg (124 lb 3 2 oz)  Physical Exam  Vitals reviewed  Constitutional:       Appearance: She is well-developed  HENT:      Head: Normocephalic and atraumatic  Eyes:      Conjunctiva/sclera: Conjunctivae normal       Pupils: Pupils are equal, round, and reactive to light  Cardiovascular:      Rate and Rhythm: Normal rate and regular rhythm  Heart sounds: Normal heart sounds     Pulmonary:      Effort: Pulmonary effort is normal       " "Breath sounds: Normal breath sounds  Abdominal:      General: Bowel sounds are normal       Palpations: Abdomen is soft  Musculoskeletal:         General: Normal range of motion  Cervical back: Normal range of motion and neck supple  Skin:     General: Skin is warm and dry  Neurological:      Mental Status: She is alert and oriented to person, place, and time  Psychiatric:         Behavior: Behavior normal          Thought Content: Thought content normal          Judgment: Judgment normal        Lab Results:   Lab Results   Component Value Date/Time    Free t4 1 1 03/30/2023 10:24 AM    Free t4 1 1 02/21/2023 10:02 AM    Free t4 1 3 01/19/2023 01:46 PM       Portions of the record may have been created with voice recognition software  Occasional wrong word or \"sound a like\" substitutions may have occurred due to the inherent limitations of voice recognition software  Read the chart carefully and recognize, using context, where substitutions have occurred    "

## 2023-04-04 NOTE — PATIENT INSTRUCTIONS
Continue Levothyroxine 50 mcg daily - 4 days with 100 mcg - 3 days a week  Obtain lab work in 4 weeks  Follow up with OB/GYN  Contact the office if there is any change in symptoms  Follow up in 3 months with lab work completed prior to visit

## 2023-05-03 LAB
T4 FREE SERPL-MCNC: 1.1 NG/DL (ref 0.8–1.8)
TSH SERPL-ACNC: 1.84 MIU/L

## 2023-06-07 LAB
T4 FREE SERPL-MCNC: 1.1 NG/DL (ref 0.8–1.8)
TSH SERPL-ACNC: 2.36 MIU/L

## 2023-06-09 NOTE — RESULT ENCOUNTER NOTE
Please call the patient regarding abnormal result  Sh is close to 2 5 at 2 36 with a normal free T4 of 1 1  Please ask her to increase her levothyroxine 50 mcg  to 3 days a week and 100 mcg - 4 days a week  We will continue to watch levels as they are completed every 4 weeks

## 2023-06-18 DIAGNOSIS — E03.9 HYPOTHYROIDISM, UNSPECIFIED TYPE: ICD-10-CM

## 2023-06-19 RX ORDER — LEVOTHYROXINE SODIUM 0.05 MG/1
TABLET ORAL
Qty: 40 TABLET | Refills: 3 | Status: SHIPPED | OUTPATIENT
Start: 2023-06-19

## 2023-07-07 LAB
T4 FREE SERPL-MCNC: 1.1 NG/DL (ref 0.8–1.8)
TSH SERPL-ACNC: 1.31 MIU/L

## 2023-07-13 DIAGNOSIS — E03.9 HYPOTHYROIDISM, UNSPECIFIED TYPE: ICD-10-CM

## 2023-07-13 RX ORDER — LEVOTHYROXINE SODIUM 0.05 MG/1
TABLET ORAL
Qty: 46 TABLET | Refills: 5 | Status: SHIPPED | OUTPATIENT
Start: 2023-07-13

## 2023-08-02 LAB
T4 FREE SERPL-MCNC: 1.1 NG/DL (ref 0.8–1.8)
TSH SERPL-ACNC: 2.02 MIU/L

## 2023-09-07 LAB
T4 FREE SERPL-MCNC: 1 NG/DL (ref 0.8–1.8)
TSH SERPL-ACNC: 1.77 MIU/L

## 2023-10-04 LAB
T4 FREE SERPL-MCNC: 1 NG/DL (ref 0.8–1.8)
TSH SERPL-ACNC: 1.53 MIU/L

## 2023-10-05 DIAGNOSIS — E03.9 HYPOTHYROIDISM, UNSPECIFIED TYPE: Primary | ICD-10-CM

## 2023-10-26 ENCOUNTER — DOCUMENTATION (OUTPATIENT)
Dept: ENDOCRINOLOGY | Facility: HOSPITAL | Age: 33
End: 2023-10-26

## 2023-10-26 NOTE — PROGRESS NOTES
Received request for thyroid lab results from [facility]. Faxed lab flowsheet including TSH & Free T4 results from 12-21-21 thru 10-4-23.

## 2023-11-08 LAB
T4 FREE SERPL-MCNC: 1.1 NG/DL (ref 0.8–1.8)
TSH SERPL-ACNC: 1.76 MIU/L

## 2023-12-19 ENCOUNTER — TELEPHONE (OUTPATIENT)
Dept: ENDOCRINOLOGY | Facility: HOSPITAL | Age: 33
End: 2023-12-19

## 2023-12-19 NOTE — TELEPHONE ENCOUNTER
Patient left voicemail, reported recently giving birth and has notice a decrease in milk production, was requesting lab work to be done..she is also overdue for appointment. Lmtcb.

## 2023-12-19 NOTE — TELEPHONE ENCOUNTER
Patient call back, she gave birth on 12/1/23. What lab work do you recommend? Orders will need to be faxed to [facility]. Patient is currently taking Levothyroxine 50 mcg 1 tablet 3 days a week and 2 tablet 4 days a week.

## 2023-12-23 DIAGNOSIS — E03.9 HYPOTHYROIDISM, UNSPECIFIED TYPE: ICD-10-CM

## 2023-12-26 RX ORDER — LEVOTHYROXINE SODIUM 0.05 MG/1
TABLET ORAL
Qty: 44 TABLET | Refills: 5 | Status: SHIPPED | OUTPATIENT
Start: 2023-12-26

## 2023-12-28 DIAGNOSIS — E03.9 HYPOTHYROIDISM, UNSPECIFIED TYPE: Primary | ICD-10-CM

## 2024-01-08 LAB
T4 FREE SERPL-MCNC: 1.2 NG/DL (ref 0.8–1.8)
TSH SERPL-ACNC: 0.28 MIU/L

## 2024-01-30 ENCOUNTER — OFFICE VISIT (OUTPATIENT)
Dept: ENDOCRINOLOGY | Facility: HOSPITAL | Age: 34
End: 2024-01-30
Payer: COMMERCIAL

## 2024-01-30 VITALS
WEIGHT: 135 LBS | BODY MASS INDEX: 24.84 KG/M2 | HEART RATE: 76 BPM | SYSTOLIC BLOOD PRESSURE: 100 MMHG | HEIGHT: 62 IN | DIASTOLIC BLOOD PRESSURE: 70 MMHG

## 2024-01-30 DIAGNOSIS — Z86.39 HISTORY OF GRAVES' DISEASE: ICD-10-CM

## 2024-01-30 DIAGNOSIS — E89.0 POSTOPERATIVE HYPOTHYROIDISM: Primary | ICD-10-CM

## 2024-01-30 PROCEDURE — 99214 OFFICE O/P EST MOD 30 MIN: CPT | Performed by: NURSE PRACTITIONER

## 2024-01-30 NOTE — PATIENT INSTRUCTIONS
Continue Levothyroxine 50 mcg daily - 4 days with 100 mcg - 3 days a week.     Obtain lab work in 6 weeks.     Contact the office if there is any change in symptoms.     Follow up in 3 months with lab work completed prior to visit.

## 2024-01-30 NOTE — PROGRESS NOTES
Lucy Seals 33 y.o. female MRN: [id number]    Encounter: [id number]      Assessment/Plan     Assessment:  This is a 33 y.o.-year-old female with postoperative hypothyroidism.      Plan:  Hypothyroidism:  TSH and free T4 are normal. We will continue to follow-up with high risk OB/GYN.  For now she will continue levothyroxine 50 mcg - 4 days a week and 100 mcg - 3 days a week. Recheck TSH and free T4 in another 6 weeks.  Changes to her levothyroxine regimen will be based on ongoing lab work.     CC: Hypothyroidism follow-up     History of Present Illness     HPI:  33 year old female with history of hyperthyroidism due to Graves disease status post thyroidectomy with resultant postoperative hypothyroidism who presents for a follow-up.  In 2015, she developed palpitations, tachycardia, weight loss was discovered to have hyperthyroidism due to Graves disease.  In September of 2015, she underwent thyroidectomy with pathology showing benign thyroid tissue.  She is maintained on levothyroxine 50 mcg - 3 days and 4 days of 100 mcg.  She is currently 8 weeks post partum.   Recent TSH from January 8, 2024 is 0.28 with Free T4 of 1.2. She experienced a miscarriage at 8 weeks in January 2023.     Review of Systems   Constitutional: Negative.  Negative for chills, fatigue and fever.   HENT: Negative.  Negative for trouble swallowing and voice change.    Eyes: Negative.  Negative for photophobia, pain, discharge, redness, itching and visual disturbance.   Respiratory: Negative.  Negative for chest tightness and shortness of breath.    Cardiovascular: Negative.  Negative for chest pain.   Gastrointestinal: Negative.  Negative for abdominal pain, constipation, diarrhea and vomiting.   Endocrine: Negative for cold intolerance, heat intolerance, polydipsia, polyphagia and polyuria.   Genitourinary: Negative.    Musculoskeletal: Negative.    Skin: Negative.    Allergic/Immunologic: Negative.    Neurological: Negative.  Negative for  "dizziness, syncope, light-headedness and headaches.   Hematological: Negative.    Psychiatric/Behavioral: Negative.     All other systems reviewed and are negative.      Historical Information   History reviewed. No pertinent past medical history.  Past Surgical History:   Procedure Laterality Date    APPENDECTOMY      THYROIDECTOMY Bilateral     TRIGGER FINGER RELEASE Left      Social History   Social History     Substance and Sexual Activity   Alcohol Use Yes    Comment: social     Social History     Substance and Sexual Activity   Drug Use No     Social History     Tobacco Use   Smoking Status Former    Current packs/day: 0.00    Average packs/day: 1 pack/day for 10.0 years (10.0 ttl pk-yrs)    Types: Cigarettes    Start date: 2008    Quit date: 2018    Years since quittin.0   Smokeless Tobacco Never     Family History:   Family History   Problem Relation Age of Onset    Diabetes type II Mother     Hyperlipidemia Father     Hypertension Father        Meds/Allergies   Current Outpatient Medications   Medication Sig Dispense Refill    levothyroxine 50 mcg tablet TAKE 1 TABLET 3 DAYS A WEEK AND 2 TABLETS 4 DAYS A WEEK. 44 tablet 5    Prenatal Vit-Iron Carbonyl-FA (prenatal multivitamin) TABS Take 1 tablet by mouth daily      amphetamine-dextroamphetamine (ADDERALL XR) 25 MG 24 hr capsule Take 1 capsule by mouth daily (Patient not taking: Reported on 2024)      Progesterone 200 MG CAPS Take 1 capsule by mouth daily (Patient not taking: Reported on 2024)       No current facility-administered medications for this visit.     Allergies   Allergen Reactions    Penicillin V Hives       Objective   Vitals: Blood pressure 100/70, pulse 76, height 5' 1.75\" (1.568 m), weight 61.2 kg (135 lb).    Physical Exam  Vitals reviewed.   Constitutional:       Appearance: She is well-developed.   HENT:      Head: Normocephalic and atraumatic.   Eyes:      Conjunctiva/sclera: Conjunctivae normal.      Pupils: " "Pupils are equal, round, and reactive to light.   Cardiovascular:      Rate and Rhythm: Normal rate and regular rhythm.      Heart sounds: Normal heart sounds.   Pulmonary:      Effort: Pulmonary effort is normal.      Breath sounds: Normal breath sounds.   Abdominal:      General: Bowel sounds are normal.      Palpations: Abdomen is soft.   Musculoskeletal:         General: Normal range of motion.      Cervical back: Normal range of motion and neck supple.   Skin:     General: Skin is warm and dry.   Neurological:      Mental Status: She is alert and oriented to person, place, and time.   Psychiatric:         Behavior: Behavior normal.         Thought Content: Thought content normal.         Judgment: Judgment normal.       Lab Results:   Lab Results   Component Value Date/Time    Free t4 1.2 01/08/2024 11:07 AM    Free t4 1.1 11/07/2023 01:14 PM    Free t4 1.0 10/04/2023 10:20 AM     Portions of the record may have been created with voice recognition software. Occasional wrong word or \"sound a like\" substitutions may have occurred due to the inherent limitations of voice recognition software. Read the chart carefully and recognize, using context, where substitutions have occurred.    "

## 2024-03-25 LAB
T4 FREE SERPL-MCNC: 1.2 NG/DL (ref 0.8–1.8)
TSH SERPL-ACNC: 0.52 MIU/L

## 2024-03-27 ENCOUNTER — TELEPHONE (OUTPATIENT)
Dept: ENDOCRINOLOGY | Facility: HOSPITAL | Age: 34
End: 2024-03-27

## 2024-03-27 NOTE — TELEPHONE ENCOUNTER
----- Message from Basilia Mccullough MD sent at 3/26/2024  3:20 PM EDT -----  Thyroid labs are normal. No adjustment to medication dose

## 2024-05-09 LAB
ALBUMIN SERPL-MCNC: 4.7 G/DL (ref 3.6–5.1)
ALBUMIN/GLOB SERPL: 2 (CALC) (ref 1–2.5)
ALP SERPL-CCNC: 78 U/L (ref 31–125)
ALT SERPL-CCNC: 11 U/L (ref 6–29)
AST SERPL-CCNC: 14 U/L (ref 10–30)
BILIRUB SERPL-MCNC: 0.6 MG/DL (ref 0.2–1.2)
BUN SERPL-MCNC: 11 MG/DL (ref 7–25)
BUN/CREAT SERPL: NORMAL (CALC) (ref 6–22)
CALCIUM SERPL-MCNC: 9.1 MG/DL (ref 8.6–10.2)
CHLORIDE SERPL-SCNC: 101 MMOL/L (ref 98–110)
CO2 SERPL-SCNC: 29 MMOL/L (ref 20–32)
CREAT SERPL-MCNC: 0.74 MG/DL (ref 0.5–0.97)
GFR/BSA.PRED SERPLBLD CYS-BASED-ARV: 109 ML/MIN/1.73M2
GLOBULIN SER CALC-MCNC: 2.3 G/DL (CALC) (ref 1.9–3.7)
GLUCOSE SERPL-MCNC: 100 MG/DL (ref 65–139)
POTASSIUM SERPL-SCNC: 4 MMOL/L (ref 3.5–5.3)
PROT SERPL-MCNC: 7 G/DL (ref 6.1–8.1)
SODIUM SERPL-SCNC: 138 MMOL/L (ref 135–146)
T4 FREE SERPL-MCNC: 1.2 NG/DL (ref 0.8–1.8)
TSH SERPL-ACNC: 1.38 MIU/L

## 2024-05-14 ENCOUNTER — OFFICE VISIT (OUTPATIENT)
Dept: ENDOCRINOLOGY | Facility: HOSPITAL | Age: 34
End: 2024-05-14
Payer: COMMERCIAL

## 2024-05-14 VITALS
WEIGHT: 126 LBS | DIASTOLIC BLOOD PRESSURE: 70 MMHG | BODY MASS INDEX: 23.19 KG/M2 | SYSTOLIC BLOOD PRESSURE: 102 MMHG | HEIGHT: 62 IN | HEART RATE: 70 BPM

## 2024-05-14 DIAGNOSIS — E89.0 POSTOPERATIVE HYPOTHYROIDISM: Primary | ICD-10-CM

## 2024-05-14 DIAGNOSIS — E03.9 HYPOTHYROIDISM AFFECTING PREGNANCY IN FIRST TRIMESTER: ICD-10-CM

## 2024-05-14 DIAGNOSIS — Z86.39 HISTORY OF GRAVES' DISEASE: ICD-10-CM

## 2024-05-14 DIAGNOSIS — O99.281 HYPOTHYROIDISM AFFECTING PREGNANCY IN FIRST TRIMESTER: ICD-10-CM

## 2024-05-14 PROCEDURE — 99214 OFFICE O/P EST MOD 30 MIN: CPT | Performed by: NURSE PRACTITIONER

## 2024-05-14 NOTE — PROGRESS NOTES
Lucy Seals 33 y.o. female MRN: [id number]    Encounter: [id number]      Assessment/Plan     Assessment:  This is a 33 y.o.-year-old female with postoperative hypothyroidism.       Plan:  Hypothyroidism:  TSH and free T4 are normal. For now she will continue levothyroxine 50 mcg - 4 days a week and 100 mcg - 3 days a week. Recheck TSH and free T4 prior to next visit.    CC: Hypothyroidism follow-up      History of Present Illness     HPI:  33 year old female with history of hyperthyroidism due to Graves disease status post thyroidectomy with resultant postoperative hypothyroidism who presents for a follow-up.  In 2015, she developed palpitations, tachycardia, weight loss was discovered to have hyperthyroidism due to Graves disease.  In September of 2015, she underwent thyroidectomy with pathology showing benign thyroid tissue.  She is maintained on levothyroxine 50 mcg - 4 days and 3 days of 100 mcg.  She is currently 8 weeks post partum.   Recent TSH from May 9, 2024 is 1.38 with Free T4 of 1.2. She experienced a miscarriage at 8 weeks in January 2023.      Review of Systems   Constitutional: Negative.  Negative for chills, fatigue and fever.   HENT: Negative.  Negative for trouble swallowing and voice change.    Eyes: Negative.  Negative for photophobia, pain, discharge, redness, itching and visual disturbance.   Respiratory: Negative.  Negative for chest tightness and shortness of breath.    Cardiovascular: Negative.  Negative for chest pain.   Gastrointestinal: Negative.  Negative for abdominal pain, constipation, diarrhea and vomiting.   Endocrine: Negative for cold intolerance, heat intolerance, polydipsia, polyphagia and polyuria.   Genitourinary: Negative.    Musculoskeletal: Negative.    Skin: Negative.    Allergic/Immunologic: Negative.    Neurological: Negative.  Negative for dizziness, syncope, light-headedness and headaches.   Hematological: Negative.    Psychiatric/Behavioral: Negative.     All  "other systems reviewed and are negative.      Historical Information   History reviewed. No pertinent past medical history.  Past Surgical History:   Procedure Laterality Date    APPENDECTOMY      THYROIDECTOMY Bilateral     TRIGGER FINGER RELEASE Left      Social History   Social History     Substance and Sexual Activity   Alcohol Use Yes    Comment: social     Social History     Substance and Sexual Activity   Drug Use No     Social History     Tobacco Use   Smoking Status Former    Current packs/day: 0.00    Average packs/day: 1 pack/day for 10.0 years (10.0 ttl pk-yrs)    Types: Cigarettes    Start date: 2008    Quit date: 2018    Years since quittin.3   Smokeless Tobacco Never     Family History:   Family History   Problem Relation Age of Onset    Diabetes type II Mother     Hyperlipidemia Father     Hypertension Father        Meds/Allergies   Current Outpatient Medications   Medication Sig Dispense Refill    amphetamine-dextroamphetamine (ADDERALL XR) 25 MG 24 hr capsule Take 1 capsule by mouth daily      levothyroxine 50 mcg tablet TAKE 1 TABLET 3 DAYS A WEEK AND 2 TABLETS 4 DAYS A WEEK. (Patient taking differently: TAKE 2 TABLET 3 DAYS A WEEK AND 1 TABLETS 4 DAYS A WEEK.) 44 tablet 5    Prenatal Vit-Iron Carbonyl-FA (prenatal multivitamin) TABS Take 1 tablet by mouth daily (Patient not taking: Reported on 2024)      Progesterone 200 MG CAPS Take 1 capsule by mouth daily (Patient not taking: Reported on 2024)       No current facility-administered medications for this visit.     Allergies   Allergen Reactions    Penicillin V Hives       Objective   Vitals: Blood pressure 102/70, pulse 70, height 5' 1.75\" (1.568 m), weight 57.2 kg (126 lb).    Physical Exam  Vitals reviewed.   Constitutional:       Appearance: She is well-developed.   HENT:      Head: Normocephalic and atraumatic.   Eyes:      Conjunctiva/sclera: Conjunctivae normal.      Pupils: Pupils are equal, round, and reactive to " "light.      Comments: Wears glasses   Cardiovascular:      Rate and Rhythm: Normal rate and regular rhythm.      Heart sounds: Normal heart sounds.   Pulmonary:      Effort: Pulmonary effort is normal.      Breath sounds: Normal breath sounds.   Abdominal:      General: Bowel sounds are normal.      Palpations: Abdomen is soft.   Musculoskeletal:         General: Normal range of motion.      Cervical back: Normal range of motion and neck supple.   Skin:     General: Skin is warm and dry.   Neurological:      Mental Status: She is alert and oriented to person, place, and time.   Psychiatric:         Behavior: Behavior normal.         Thought Content: Thought content normal.         Judgment: Judgment normal.         Lab Results:   Lab Results   Component Value Date/Time    Free t4 1.2 05/09/2024 08:44 AM    Free t4 1.2 03/25/2024 08:32 AM    Free t4 1.2 01/08/2024 11:07 AM       Portions of the record may have been created with voice recognition software. Occasional wrong word or \"sound a like\" substitutions may have occurred due to the inherent limitations of voice recognition software. Read the chart carefully and recognize, using context, where substitutions have occurred.    "

## 2024-05-14 NOTE — PATIENT INSTRUCTIONS
Continue Levothyroxine 50 mcg daily - 4 days with 100 mcg - 3 days a week.     Contact the office if there is any change in symptoms.     Follow up in 6 months with lab work completed prior to visit.

## 2024-06-21 DIAGNOSIS — E03.9 HYPOTHYROIDISM, UNSPECIFIED TYPE: ICD-10-CM

## 2024-06-21 RX ORDER — LEVOTHYROXINE SODIUM 0.05 MG/1
TABLET ORAL
Qty: 44 TABLET | Refills: 1 | Status: SHIPPED | OUTPATIENT
Start: 2024-06-21

## 2024-08-13 DIAGNOSIS — E03.9 HYPOTHYROIDISM, UNSPECIFIED TYPE: ICD-10-CM

## 2024-08-13 RX ORDER — LEVOTHYROXINE SODIUM 50 UG/1
TABLET ORAL
Qty: 44 TABLET | Refills: 1 | Status: SHIPPED | OUTPATIENT
Start: 2024-08-13

## 2024-09-30 DIAGNOSIS — E03.9 HYPOTHYROIDISM, UNSPECIFIED TYPE: ICD-10-CM

## 2024-09-30 NOTE — TELEPHONE ENCOUNTER
Requested medication(s) are due for refill today: Yes  Patient has already received a courtesy refill: Yes  Other reason request has been forwarded to provider: Guidelines failed.

## 2024-09-30 NOTE — TELEPHONE ENCOUNTER
Reason for call:   [x] Refill   [] Prior Auth  [x] Other: SIG CHANGE. Patient was supposed to be taking 2 tabs 3 times a week and 1 tab 4 times a week. Sig accidentally changed back in December.    Office:   [] PCP/Provider -   [x] Specialty/Provider - [place]/ BRAD Mcneil    Medication: levothyroxine 50 mcg tablet     Dose/Frequency: Take 2 tablets by mouth 3 times a week and 1 tablet by mouth 4 times a week.     Quantity: 44    Pharmacy: [facility]/pharmacy #[id number] - [place] - [address] [phone]    Does the patient have enough for 3 days?   [] Yes   [x] No - Send as HP to POD

## 2024-10-01 RX ORDER — LEVOTHYROXINE SODIUM 50 UG/1
TABLET ORAL
Qty: 44 TABLET | Refills: 1 | Status: SHIPPED | OUTPATIENT
Start: 2024-10-01

## 2024-11-06 ENCOUNTER — TELEPHONE (OUTPATIENT)
Age: 34
End: 2024-11-06

## 2024-11-08 LAB
ALBUMIN SERPL-MCNC: 4.5 G/DL (ref 3.6–5.1)
ALBUMIN/GLOB SERPL: 2 (CALC) (ref 1–2.5)
ALP SERPL-CCNC: 52 U/L (ref 31–125)
ALT SERPL-CCNC: 13 U/L (ref 6–29)
AST SERPL-CCNC: 15 U/L (ref 10–30)
BILIRUB SERPL-MCNC: 0.6 MG/DL (ref 0.2–1.2)
BUN SERPL-MCNC: 8 MG/DL (ref 7–25)
BUN/CREAT SERPL: NORMAL (CALC) (ref 6–22)
CALCIUM SERPL-MCNC: 9.1 MG/DL (ref 8.6–10.2)
CHLORIDE SERPL-SCNC: 106 MMOL/L (ref 98–110)
CO2 SERPL-SCNC: 24 MMOL/L (ref 20–32)
CREAT SERPL-MCNC: 0.54 MG/DL (ref 0.5–0.97)
GFR/BSA.PRED SERPLBLD CYS-BASED-ARV: 123 ML/MIN/1.73M2
GLOBULIN SER CALC-MCNC: 2.2 G/DL (CALC) (ref 1.9–3.7)
GLUCOSE SERPL-MCNC: 116 MG/DL (ref 65–139)
POTASSIUM SERPL-SCNC: 4 MMOL/L (ref 3.5–5.3)
PROT SERPL-MCNC: 6.7 G/DL (ref 6.1–8.1)
SODIUM SERPL-SCNC: 140 MMOL/L (ref 135–146)
T4 FREE SERPL-MCNC: 1.2 NG/DL (ref 0.8–1.8)
TSH SERPL-ACNC: 2.03 MIU/L

## 2024-11-20 ENCOUNTER — OFFICE VISIT (OUTPATIENT)
Dept: ENDOCRINOLOGY | Facility: HOSPITAL | Age: 34
End: 2024-11-20
Payer: COMMERCIAL

## 2024-11-20 VITALS
WEIGHT: 123.8 LBS | HEART RATE: 85 BPM | BODY MASS INDEX: 22.78 KG/M2 | HEIGHT: 62 IN | OXYGEN SATURATION: 100 % | DIASTOLIC BLOOD PRESSURE: 70 MMHG | SYSTOLIC BLOOD PRESSURE: 110 MMHG

## 2024-11-20 DIAGNOSIS — E89.0 POSTOPERATIVE HYPOTHYROIDISM: Primary | ICD-10-CM

## 2024-11-20 DIAGNOSIS — Z86.39 HISTORY OF GRAVES' DISEASE: ICD-10-CM

## 2024-11-20 DIAGNOSIS — E03.9 HYPOTHYROIDISM, UNSPECIFIED TYPE: ICD-10-CM

## 2024-11-20 PROCEDURE — 99214 OFFICE O/P EST MOD 30 MIN: CPT | Performed by: NURSE PRACTITIONER

## 2024-11-20 RX ORDER — LEVOTHYROXINE SODIUM 50 UG/1
TABLET ORAL
Qty: 44 TABLET | Refills: 12 | Status: SHIPPED | OUTPATIENT
Start: 2024-11-20

## 2024-11-20 RX ORDER — DEXTROAMPHETAMINE SACCHARATE, AMPHETAMINE ASPARTATE, DEXTROAMPHETAMINE SULFATE AND AMPHETAMINE SULFATE 2.5; 2.5; 2.5; 2.5 MG/1; MG/1; MG/1; MG/1
1 TABLET ORAL DAILY
COMMUNITY
Start: 2024-10-30

## 2024-11-20 NOTE — PATIENT INSTRUCTIONS
Continue Levothyroxine 50 mcg daily - 4 days with 100 mcg - 3 days a week.     Contact the office if there is any change in symptoms.     Follow up in 1 year with lab work completed prior to visit.

## 2024-11-20 NOTE — PROGRESS NOTES
Lucy Seals 34 y.o. female MRN: [id number]    Encounter: [id number]      Assessment & Plan     Assessment:  This is a 34 y.o.-year-old female with postoperative hypothyroidism.       Plan:  Hypothyroidism:  TSH and free T4 are normal. For now she will continue levothyroxine 50 mcg - 4 days a week and 100 mcg - 3 days a week. Recheck TSH and free T4 prior to next visit.     CC: Hypothyroidism follow-up      History of Present Illness     HPI:  33 year old female with history of hyperthyroidism due to Graves disease status post thyroidectomy with resultant postoperative hypothyroidism who presents for a follow-up.  In 2015, she developed palpitations, tachycardia, weight loss was discovered to have hyperthyroidism due to Graves disease.  In September of 2015, she underwent thyroidectomy with pathology showing benign thyroid tissue.  She is maintained on levothyroxine 50 mcg - 4 days and 3 days of 100 mcg.  She is currently 8 weeks post partum.   Recent TSH from May 9, 2024 is 1.38 with Free T4 of 1.2. She experienced a miscarriage at 8 weeks in January 2023.      Review of Systems   Constitutional: Negative.  Negative for chills, fatigue and fever.   HENT:  Negative for trouble swallowing and voice change.    Eyes: Negative.  Negative for photophobia, pain, discharge, redness, itching and visual disturbance.   Respiratory: Negative.  Negative for chest tightness and shortness of breath.    Cardiovascular: Negative.  Negative for chest pain.   Gastrointestinal: Negative.  Negative for abdominal pain, constipation, diarrhea and vomiting.   Endocrine: Negative for cold intolerance, heat intolerance, polydipsia, polyphagia and polyuria.   Genitourinary: Negative.    Musculoskeletal: Negative.    Skin:  Positive for rash (dermatology).   Allergic/Immunologic: Negative.    Neurological: Negative.  Negative for dizziness, syncope, light-headedness and headaches.   Hematological: Negative.    Psychiatric/Behavioral:  "Negative.     All other systems reviewed and are negative.      Historical Information   History reviewed. No pertinent past medical history.  Past Surgical History:   Procedure Laterality Date    APPENDECTOMY      THYROIDECTOMY Bilateral     TRIGGER FINGER RELEASE Left      Social History   Social History     Substance and Sexual Activity   Alcohol Use Yes    Comment: social     Social History     Substance and Sexual Activity   Drug Use No     Social History     Tobacco Use   Smoking Status Former    Current packs/day: 0.00    Average packs/day: 1 pack/day for 10.0 years (10.0 ttl pk-yrs)    Types: Cigarettes    Start date: 2008    Quit date: 2018    Years since quittin.8   Smokeless Tobacco Never     Family History:   Family History   Problem Relation Age of Onset    Diabetes type II Mother     Hyperlipidemia Father     Hypertension Father        Meds/Allergies   Current Outpatient Medications   Medication Sig Dispense Refill    amphetamine-dextroamphetamine (ADDERALL XR) 25 MG 24 hr capsule Take 1 capsule by mouth daily      amphetamine-dextroamphetamine (ADDERALL) 10 mg tablet Take 1 tablet by mouth in the morning (Patient taking differently: Take 1 tablet by mouth as needed)      levothyroxine 50 mcg tablet Take 2 tablets by mouth 3 times a week and 1 tablet by mouth 4 times a week. 44 tablet 1    Prenatal Vit-Iron Carbonyl-FA (prenatal multivitamin) TABS Take 1 tablet by mouth daily (Patient not taking: Reported on 2024)      Progesterone 200 MG CAPS Take 1 capsule by mouth daily (Patient not taking: Reported on 2024)       No current facility-administered medications for this visit.     Allergies   Allergen Reactions    Penicillin V Hives       Objective   Vitals: Blood pressure 110/70, pulse 85, height 5' 1.75\" (1.568 m), weight 56.2 kg (123 lb 12.8 oz), SpO2 100%.    Physical Exam  Vitals reviewed.   Constitutional:       Appearance: She is well-developed.   HENT:      Head: " "Normocephalic and atraumatic.   Eyes:      Conjunctiva/sclera: Conjunctivae normal.      Pupils: Pupils are equal, round, and reactive to light.      Comments: Wears glasses   Cardiovascular:      Rate and Rhythm: Normal rate and regular rhythm.      Heart sounds: Normal heart sounds.   Pulmonary:      Effort: Pulmonary effort is normal.      Breath sounds: Normal breath sounds.   Abdominal:      General: Bowel sounds are normal.      Palpations: Abdomen is soft.   Musculoskeletal:         General: Normal range of motion.      Cervical back: Normal range of motion and neck supple.   Skin:     General: Skin is warm and dry.   Neurological:      Mental Status: She is alert and oriented to person, place, and time.   Psychiatric:         Behavior: Behavior normal.         Thought Content: Thought content normal.         Judgment: Judgment normal.         Lab Results:   Lab Results   Component Value Date/Time    Free t4 1.2 11/07/2024 10:26 AM    Free t4 1.2 05/09/2024 08:44 AM    Free t4 1.2 03/25/2024 08:32 AM         Portions of the record may have been created with voice recognition software. Occasional wrong word or \"sound a like\" substitutions may have occurred due to the inherent limitations of voice recognition software. Read the chart carefully and recognize, using context, where substitutions have occurred.    "

## 2025-05-28 DIAGNOSIS — E03.9 HYPOTHYROIDISM, UNSPECIFIED TYPE: ICD-10-CM

## 2025-05-29 RX ORDER — LEVOTHYROXINE SODIUM 50 UG/1
TABLET ORAL
Qty: 132 TABLET | Refills: 5 | Status: SHIPPED | OUTPATIENT
Start: 2025-05-29